# Patient Record
Sex: FEMALE | Race: AMERICAN INDIAN OR ALASKA NATIVE
[De-identification: names, ages, dates, MRNs, and addresses within clinical notes are randomized per-mention and may not be internally consistent; named-entity substitution may affect disease eponyms.]

---

## 2019-08-23 ENCOUNTER — HOSPITAL ENCOUNTER (INPATIENT)
Dept: HOSPITAL 5 - ED | Age: 49
LOS: 6 days | Discharge: HOME | DRG: 689 | End: 2019-08-29
Attending: HOSPITALIST | Admitting: INTERNAL MEDICINE
Payer: COMMERCIAL

## 2019-08-23 DIAGNOSIS — E11.9: ICD-10-CM

## 2019-08-23 DIAGNOSIS — I10: ICD-10-CM

## 2019-08-23 DIAGNOSIS — Z79.899: ICD-10-CM

## 2019-08-23 DIAGNOSIS — N30.01: Primary | ICD-10-CM

## 2019-08-23 DIAGNOSIS — G93.41: ICD-10-CM

## 2019-08-23 DIAGNOSIS — F32.9: ICD-10-CM

## 2019-08-23 DIAGNOSIS — R45.851: ICD-10-CM

## 2019-08-23 DIAGNOSIS — F10.10: ICD-10-CM

## 2019-08-23 LAB
ALBUMIN SERPL-MCNC: 3.8 G/DL (ref 3.9–5)
ALT SERPL-CCNC: 15 UNITS/L (ref 7–56)
BACTERIA #/AREA URNS HPF: (no result) /HPF
BASOPHILS # (AUTO): 0.1 K/MM3 (ref 0–0.1)
BASOPHILS NFR BLD AUTO: 1.1 % (ref 0–1.8)
BENZODIAZEPINES SCREEN,URINE: (no result)
BILIRUB UR QL STRIP: (no result)
BLOOD UR QL VISUAL: (no result)
BUN SERPL-MCNC: 17 MG/DL (ref 7–17)
BUN/CREAT SERPL: 24 %
CALCIUM SERPL-MCNC: 9.7 MG/DL (ref 8.4–10.2)
EOSINOPHIL # BLD AUTO: 0.1 K/MM3 (ref 0–0.4)
EOSINOPHIL NFR BLD AUTO: 1.8 % (ref 0–4.3)
HCT VFR BLD CALC: 35.7 % (ref 30.3–42.9)
HEMOLYSIS INDEX: 19
HGB BLD-MCNC: 12 GM/DL (ref 10.1–14.3)
LYMPHOCYTES # BLD AUTO: 3 K/MM3 (ref 1.2–5.4)
LYMPHOCYTES NFR BLD AUTO: 46.2 % (ref 13.4–35)
MCHC RBC AUTO-ENTMCNC: 34 % (ref 30–34)
MCV RBC AUTO: 99 FL (ref 79–97)
METHADONE SCREEN,URINE: (no result)
MONOCYTES # (AUTO): 0.8 K/MM3 (ref 0–0.8)
MONOCYTES % (AUTO): 12.2 % (ref 0–7.3)
MUCOUS THREADS #/AREA URNS HPF: (no result) /HPF
OPIATE SCREEN,URINE: (no result)
PH UR STRIP: 6 [PH] (ref 5–7)
PLATELET # BLD: 248 K/MM3 (ref 140–440)
PROT UR STRIP-MCNC: (no result) MG/DL
RBC # BLD AUTO: 3.62 M/MM3 (ref 3.65–5.03)
RBC #/AREA URNS HPF: 30 /HPF (ref 0–6)
UROBILINOGEN UR-MCNC: < 2 MG/DL (ref ?–2)
WBC #/AREA URNS HPF: 33 /HPF (ref 0–6)

## 2019-08-23 PROCEDURE — 80320 DRUG SCREEN QUANTALCOHOLS: CPT

## 2019-08-23 PROCEDURE — 82140 ASSAY OF AMMONIA: CPT

## 2019-08-23 PROCEDURE — 99406 BEHAV CHNG SMOKING 3-10 MIN: CPT

## 2019-08-23 PROCEDURE — 80307 DRUG TEST PRSMV CHEM ANLYZR: CPT

## 2019-08-23 PROCEDURE — 36415 COLL VENOUS BLD VENIPUNCTURE: CPT

## 2019-08-23 PROCEDURE — 82550 ASSAY OF CK (CPK): CPT

## 2019-08-23 PROCEDURE — 82962 GLUCOSE BLOOD TEST: CPT

## 2019-08-23 PROCEDURE — 80048 BASIC METABOLIC PNL TOTAL CA: CPT

## 2019-08-23 PROCEDURE — G0480 DRUG TEST DEF 1-7 CLASSES: HCPCS

## 2019-08-23 PROCEDURE — 85007 BL SMEAR W/DIFF WBC COUNT: CPT

## 2019-08-23 PROCEDURE — 93010 ELECTROCARDIOGRAM REPORT: CPT

## 2019-08-23 PROCEDURE — 93005 ELECTROCARDIOGRAM TRACING: CPT

## 2019-08-23 PROCEDURE — 80053 COMPREHEN METABOLIC PANEL: CPT

## 2019-08-23 PROCEDURE — 70450 CT HEAD/BRAIN W/O DYE: CPT

## 2019-08-23 PROCEDURE — 87086 URINE CULTURE/COLONY COUNT: CPT

## 2019-08-23 PROCEDURE — 81001 URINALYSIS AUTO W/SCOPE: CPT

## 2019-08-23 PROCEDURE — 85025 COMPLETE CBC W/AUTO DIFF WBC: CPT

## 2019-08-23 PROCEDURE — 87116 MYCOBACTERIA CULTURE: CPT

## 2019-08-23 NOTE — EMERGENCY DEPARTMENT REPORT
ED Altered Mental Status HPI





- General


Chief Complaint: Altered Mental Status


Stated Complaint: CONFUSION


Time Seen by Provider: 08/23/19 18:17


Source: EMS


Mode of arrival: Stretcher


Limitations: No Limitations





- History of Present Illness


Initial Comments: 





Vision is a 48-year-old female that presents from a local psychiatric facility 

for confusion.  Patient was over at [facility] on a 1013 for suicidal ideations 

and the patient was noted to be confused just prior to arrival.  Patient denies 

pain.  Patient denies any physical complaints.  Patient denies dysuria.  Patient

states she doesn't believe she is confused.


MD Complaint: altered mental status, confusion


-: Sudden


Consistency of Symptoms: constant


Associated Symptoms: denies other symptoms.  denies: chest pain, cough, 

diaphoresis, fever/chills, headaches, loss of appetite, malaise, nause

a/vomiting, rash, seizure, shortness of breath, syncope, weakness, foul smelling

urine, difficulty walking, diarrhea, incontinence





- Related Data


                                    Allergies











Allergy/AdvReac Type Severity Reaction Status Date / Time


 


No Known Allergies Allergy   Unverified 08/23/19 18:30














ED Review of Systems


ROS: 


Stated complaint: CONFUSION


Other details as noted in HPI





Constitutional: denies: chills, fever


Eyes: denies: eye pain, eye discharge, vision change


ENT: denies: ear pain, throat pain


Respiratory: denies: cough, shortness of breath, wheezing


Cardiovascular: denies: chest pain, palpitations


Endocrine: no symptoms reported


Gastrointestinal: denies: abdominal pain, nausea, diarrhea


Genitourinary: denies: urgency, dysuria, discharge


Musculoskeletal: denies: back pain, joint swelling, arthralgia


Skin: denies: rash, lesions


Neurological: confusion.  denies: headache, weakness, paresthesias


Psychiatric: denies: anxiety, depression


Hematological/Lymphatic: denies: easy bleeding, easy bruising





ED Past Medical Hx





- Past Medical History


Previous Medical History?: Yes


Hx Hypertension: Yes


Hx Diabetes: Yes





- Surgical History


Past Surgical History?: No





- Family History


Family history: no significant





- Social History


Smoking Status: Never Smoker


Substance Use Type: None





ED Physical Exam





- General


Limitations: No Limitations


General appearance: alert, in no apparent distress





- Head


Head exam: Present: atraumatic, normocephalic





- Eye


Eye exam: Present: normal appearance, PERRL


Pupils: Present: normal accommodation





- ENT


ENT exam: Present: mucous membranes moist





- Neck


Neck exam: Present: normal inspection





- Respiratory


Respiratory exam: Present: normal lung sounds bilaterally.  Absent: respiratory 

distress





- Cardiovascular


Cardiovascular Exam: Present: regular rate, normal rhythm.  Absent: systolic 

murmur, diastolic murmur, rubs, gallop





- GI/Abdominal


GI/Abdominal exam: Present: soft, normal bowel sounds





- Extremities Exam


Extremities exam: Present: normal inspection





- Back Exam


Back exam: Present: normal inspection





- Neurological Exam


Neurological exam: Present: alert, altered (patient is oriented 2.  Patient is 

disoriented to time.)





- Psychiatric


Psychiatric exam: Present: normal affect, normal mood





- Skin


Skin exam: Present: warm, dry, intact, normal color.  Absent: rash





- Assessment


Assessment Interval: Baseline





- Level of Consciousness


1a. Level of Consciousness: alert/keenly responsive





- LOC Questions


1b. LOC Questions: answers both correctly





- LOC Command


1c. LOC Commands: performs tasks correctly





- Best Gaze


2. Best Gaze: normal





- Visual


3. Visual: no visual loss





- Facial Palsy


4. Facial Palsy: normal symmetrical movement





- Motor Arm


5a. Motor Arm Left: no drift


5b. Motor Arm Right: no drift





- Motor Leg


6a. Motor Leg Left: no drift


6b. Motor Leg Right: no drift





- Limb Ataxia


7. Limb Ataxia: absent





- Sensory


8. Sensory: normal





- Best Language


9. Best Language: no aphasia





- Dysarthria


10. Dysarthria: normal





- Extinction and Inattention


11. Extinction/Inattention: no abnormality





- Scoring


Total Score: 0


Stroke Severity: No Stroke Symptoms





ED Course


                                   Vital Signs











  08/23/19 08/23/19 08/23/19





  18:05 18:06 18:13


 


Temperature  98.3 F 


 


Pulse Rate 93 H 94 H 


 


Respiratory 18 18 18





Rate   


 


Blood Pressure  116/72 


 


Blood Pressure 116/72  





[Left]   


 


O2 Sat by Pulse 99 99 99





Oximetry   














  08/23/19 08/23/19 08/23/19





  19:31 19:45 20:01


 


Temperature   


 


Pulse Rate 76 95 H 80


 


Respiratory 13 16 12





Rate   


 


Blood Pressure 125/81 121/82 124/80


 


Blood Pressure   





[Left]   


 


O2 Sat by Pulse 99 99 99





Oximetry   














  08/23/19 08/23/19 08/23/19





  20:15 20:31 20:45


 


Temperature   


 


Pulse Rate 82 85 85


 


Respiratory 12 11 L 14





Rate   


 


Blood Pressure 119/80 124/84 120/77


 


Blood Pressure   





[Left]   


 


O2 Sat by Pulse 100  97





Oximetry   














  08/23/19





  21:00


 


Temperature 


 


Pulse Rate 85


 


Respiratory 16





Rate 


 


Blood Pressure 107/65


 


Blood Pressure 





[Left] 


 


O2 Sat by Pulse 95





Oximetry 














- Reevaluation(s)


Reevaluation #1: 


Patient is still confused.  I discussed results with patient.  Patient will be 

admitted to the hospitalist service.


08/23/19 22:05








- Consultations


Consultation #1: 


Hospitalist consultation for admission.  Hospitalist to admit patient.


08/23/19 22:05








- Lab Data


Result diagrams: 


                                 08/23/19 18:26





                                 08/23/19 18:26


                                   Lab Results











  08/23/19 08/23/19 08/23/19 Range/Units





  18:26 18:26 18:26 


 


WBC  6.6    (4.5-11.0)  K/mm3


 


RBC  3.62 L    (3.65-5.03)  M/mm3


 


Hgb  12.0    (10.1-14.3)  gm/dl


 


Hct  35.7    (30.3-42.9)  %


 


MCV  99 H    (79-97)  fl


 


MCH  33 H    (28-32)  pg


 


MCHC  34    (30-34)  %


 


RDW  13.6    (13.2-15.2)  %


 


Plt Count  248    (140-440)  K/mm3


 


Lymph % (Auto)  46.2 H    (13.4-35.0)  %


 


Mono % (Auto)  12.2 H    (0.0-7.3)  %


 


Eos % (Auto)  1.8    (0.0-4.3)  %


 


Baso % (Auto)  1.1    (0.0-1.8)  %


 


Lymph #  3.0    (1.2-5.4)  K/mm3


 


Mono #  0.8    (0.0-0.8)  K/mm3


 


Eos #  0.1    (0.0-0.4)  K/mm3


 


Baso #  0.1    (0.0-0.1)  K/mm3


 


Seg Neutrophils %  38.7 L    (40.0-70.0)  %


 


Seg Neutrophils #  2.5    (1.8-7.7)  K/mm3


 


Sodium   139   (137-145)  mmol/L


 


Potassium   5.0   (3.6-5.0)  mmol/L


 


Chloride   103.2   ()  mmol/L


 


Carbon Dioxide   24   (22-30)  mmol/L


 


Anion Gap   17   mmol/L


 


BUN   17   (7-17)  mg/dL


 


Creatinine   0.7   (0.7-1.2)  mg/dL


 


Estimated GFR   > 60   ml/min


 


BUN/Creatinine Ratio   24   %


 


Glucose   107 H   ()  mg/dL


 


Calcium   9.7   (8.4-10.2)  mg/dL


 


Total Bilirubin   0.30   (0.1-1.2)  mg/dL


 


AST   20   (5-40)  units/L


 


ALT   15   (7-56)  units/L


 


Alkaline Phosphatase   72   ()  units/L


 


Ammonia    37.0  (25-60)  umol/L


 


Total Creatine Kinase   44   ()  units/L


 


Total Protein   6.8   (6.3-8.2)  g/dL


 


Albumin   3.8 L   (3.9-5)  g/dL


 


Albumin/Globulin Ratio   1.3   %


 


Urine Color     (Yellow)  


 


Urine Turbidity     (Clear)  


 


Urine pH     (5.0-7.0)  


 


Ur Specific Gravity     (1.003-1.030)  


 


Urine Protein     (Negative)  mg/dL


 


Urine Glucose (UA)     (Negative)  mg/dL


 


Urine Ketones     (Negative)  mg/dL


 


Urine Blood     (Negative)  


 


Urine Nitrite     (Negative)  


 


Urine Bilirubin     (Negative)  


 


Urine Urobilinogen     (<2.0)  mg/dL


 


Ur Leukocyte Esterase     (Negative)  


 


Urine WBC (Auto)     (0.0-6.0)  /HPF


 


Urine RBC (Auto)     (0.0-6.0)  /HPF


 


U Epithel Cells (Auto)     (0-13.0)  /HPF


 


Urine Bacteria (Auto)     (Negative)  /HPF


 


Urine Mucus     /HPF


 


Salicylates     (2.8-20.0)  mg/dL


 


Urine Opiates Screen     


 


Urine Methadone Screen     


 


Acetaminophen     (10.0-30.0)  ug/mL


 


Ur Barbiturates Screen     


 


Ur Phencyclidine Scrn     


 


Ur Amphetamines Screen     


 


U Benzodiazepines Scrn     


 


Urine Cocaine Screen     


 


U Marijuana (THC) Screen     


 


Drugs of Abuse Note     


 


Plasma/Serum Alcohol     (0-0.07)  %














  08/23/19 08/23/19 08/23/19 Range/Units





  18:26 18:26 18:26 


 


WBC     (4.5-11.0)  K/mm3


 


RBC     (3.65-5.03)  M/mm3


 


Hgb     (10.1-14.3)  gm/dl


 


Hct     (30.3-42.9)  %


 


MCV     (79-97)  fl


 


MCH     (28-32)  pg


 


MCHC     (30-34)  %


 


RDW     (13.2-15.2)  %


 


Plt Count     (140-440)  K/mm3


 


Lymph % (Auto)     (13.4-35.0)  %


 


Mono % (Auto)     (0.0-7.3)  %


 


Eos % (Auto)     (0.0-4.3)  %


 


Baso % (Auto)     (0.0-1.8)  %


 


Lymph #     (1.2-5.4)  K/mm3


 


Mono #     (0.0-0.8)  K/mm3


 


Eos #     (0.0-0.4)  K/mm3


 


Baso #     (0.0-0.1)  K/mm3


 


Seg Neutrophils %     (40.0-70.0)  %


 


Seg Neutrophils #     (1.8-7.7)  K/mm3


 


Sodium     (137-145)  mmol/L


 


Potassium     (3.6-5.0)  mmol/L


 


Chloride     ()  mmol/L


 


Carbon Dioxide     (22-30)  mmol/L


 


Anion Gap     mmol/L


 


BUN     (7-17)  mg/dL


 


Creatinine     (0.7-1.2)  mg/dL


 


Estimated GFR     ml/min


 


BUN/Creatinine Ratio     %


 


Glucose     ()  mg/dL


 


Calcium     (8.4-10.2)  mg/dL


 


Total Bilirubin     (0.1-1.2)  mg/dL


 


AST     (5-40)  units/L


 


ALT     (7-56)  units/L


 


Alkaline Phosphatase     ()  units/L


 


Ammonia     (25-60)  umol/L


 


Total Creatine Kinase     ()  units/L


 


Total Protein     (6.3-8.2)  g/dL


 


Albumin     (3.9-5)  g/dL


 


Albumin/Globulin Ratio     %


 


Urine Color     (Yellow)  


 


Urine Turbidity     (Clear)  


 


Urine pH     (5.0-7.0)  


 


Ur Specific Gravity     (1.003-1.030)  


 


Urine Protein     (Negative)  mg/dL


 


Urine Glucose (UA)     (Negative)  mg/dL


 


Urine Ketones     (Negative)  mg/dL


 


Urine Blood     (Negative)  


 


Urine Nitrite     (Negative)  


 


Urine Bilirubin     (Negative)  


 


Urine Urobilinogen     (<2.0)  mg/dL


 


Ur Leukocyte Esterase     (Negative)  


 


Urine WBC (Auto)     (0.0-6.0)  /HPF


 


Urine RBC (Auto)     (0.0-6.0)  /HPF


 


U Epithel Cells (Auto)     (0-13.0)  /HPF


 


Urine Bacteria (Auto)     (Negative)  /HPF


 


Urine Mucus     /HPF


 


Salicylates  < 0.3 L    (2.8-20.0)  mg/dL


 


Urine Opiates Screen     


 


Urine Methadone Screen     


 


Acetaminophen   < 5.0 L   (10.0-30.0)  ug/mL


 


Ur Barbiturates Screen     


 


Ur Phencyclidine Scrn     


 


Ur Amphetamines Screen     


 


U Benzodiazepines Scrn     


 


Urine Cocaine Screen     


 


U Marijuana (THC) Screen     


 


Drugs of Abuse Note     


 


Plasma/Serum Alcohol    < 0.01  (0-0.07)  %














  08/23/19 08/23/19 Range/Units





  18:46 18:46 


 


WBC    (4.5-11.0)  K/mm3


 


RBC    (3.65-5.03)  M/mm3


 


Hgb    (10.1-14.3)  gm/dl


 


Hct    (30.3-42.9)  %


 


MCV    (79-97)  fl


 


MCH    (28-32)  pg


 


MCHC    (30-34)  %


 


RDW    (13.2-15.2)  %


 


Plt Count    (140-440)  K/mm3


 


Lymph % (Auto)    (13.4-35.0)  %


 


Mono % (Auto)    (0.0-7.3)  %


 


Eos % (Auto)    (0.0-4.3)  %


 


Baso % (Auto)    (0.0-1.8)  %


 


Lymph #    (1.2-5.4)  K/mm3


 


Mono #    (0.0-0.8)  K/mm3


 


Eos #    (0.0-0.4)  K/mm3


 


Baso #    (0.0-0.1)  K/mm3


 


Seg Neutrophils %    (40.0-70.0)  %


 


Seg Neutrophils #    (1.8-7.7)  K/mm3


 


Sodium    (137-145)  mmol/L


 


Potassium    (3.6-5.0)  mmol/L


 


Chloride    ()  mmol/L


 


Carbon Dioxide    (22-30)  mmol/L


 


Anion Gap    mmol/L


 


BUN    (7-17)  mg/dL


 


Creatinine    (0.7-1.2)  mg/dL


 


Estimated GFR    ml/min


 


BUN/Creatinine Ratio    %


 


Glucose    ()  mg/dL


 


Calcium    (8.4-10.2)  mg/dL


 


Total Bilirubin    (0.1-1.2)  mg/dL


 


AST    (5-40)  units/L


 


ALT    (7-56)  units/L


 


Alkaline Phosphatase    ()  units/L


 


Ammonia    (25-60)  umol/L


 


Total Creatine Kinase    ()  units/L


 


Total Protein    (6.3-8.2)  g/dL


 


Albumin    (3.9-5)  g/dL


 


Albumin/Globulin Ratio    %


 


Urine Color  Yellow   (Yellow)  


 


Urine Turbidity  Slightly-cloudy   (Clear)  


 


Urine pH  6.0   (5.0-7.0)  


 


Ur Specific Gravity  1.020   (1.003-1.030)  


 


Urine Protein  <15 mg/dl   (Negative)  mg/dL


 


Urine Glucose (UA)  Neg   (Negative)  mg/dL


 


Urine Ketones  Neg   (Negative)  mg/dL


 


Urine Blood  Neg   (Negative)  


 


Urine Nitrite  Neg   (Negative)  


 


Urine Bilirubin  Neg   (Negative)  


 


Urine Urobilinogen  < 2.0   (<2.0)  mg/dL


 


Ur Leukocyte Esterase  Lg   (Negative)  


 


Urine WBC (Auto)  33.0 H   (0.0-6.0)  /HPF


 


Urine RBC (Auto)  30.0   (0.0-6.0)  /HPF


 


U Epithel Cells (Auto)  12.0   (0-13.0)  /HPF


 


Urine Bacteria (Auto)  1+   (Negative)  /HPF


 


Urine Mucus  Few   /HPF


 


Salicylates    (2.8-20.0)  mg/dL


 


Urine Opiates Screen   Presumptive negative  


 


Urine Methadone Screen   Presumptive negative  


 


Acetaminophen    (10.0-30.0)  ug/mL


 


Ur Barbiturates Screen   Presumptive negative  


 


Ur Phencyclidine Scrn   Presumptive negative  


 


Ur Amphetamines Screen   Presumptive negative  


 


U Benzodiazepines Scrn   Presumptive negative  


 


Urine Cocaine Screen   Presumptive negative  


 


U Marijuana (THC) Screen   Presumptive negative  


 


Drugs of Abuse Note   Disclamer  


 


Plasma/Serum Alcohol    (0-0.07)  %














- EKG Data


-: EKG Interpreted by Me


EKG shows normal: sinus rhythm, axis, intervals, QRS complexes, ST-T waves


Rate: normal





- Radiology Data


Radiology results: report reviewed





NONENHANCED CT SCAN OF THE BRAIN: 





INDICATION: 


Altered Mental Status. 





TECHNIQUE: Routine CT head without contrast. Sagittal and coronal reformatted 

images were obtained.


All CT scans at this location are performed using CT dose reduction for ALARA by

 means of automated 


exposure control. 





COMPARISON: 


None. 





FINDINGS: 





BRAIN / INTRACRANIAL CONTENTS: No acute hemorrhage, mass effect, midline shift, 

hydrocephalus, or 


acute, large territorial infarct. Extracerebral space is seen bifrontally 

symmetrically with 


prominent interhemispheric fissure. This is due to involution. Extra cerebellar 

space is also seen 


bilaterally symmetrically. No significant white matter abnormality. 





CRANIOCERVICAL JUNCTION: Mild tonsillar ectopia is seen. Cervicomedullary 

junction is not 


compromised. 





ORBITS: Bony remodeling is seen along the medial wall of the left orbit. Though 

the this appears to


be from old healed trauma, this could also be congenital. 


SINUSES / MASTOIDS: No significant abnormality of the visualized paranasal 

sinuses or mastoid air 


cells. 





ADDITIONAL FINDINGS: None. 





IMPRESSION: 


I do not see an acute parenchymal lesion in the brain. 








Critical Care Time: Yes


Critical care attestation.: 


If time is entered above; I have spent that time in minutes in the direct care 

of this critically ill patient, excluding procedure time.





Critical Care Time: 





35 minutes





ED Disposition


Clinical Impression: 


 Encephalopathy acute, Confusion





Altered mental state


Qualifiers:


 Altered mental status type: unspecified Qualified Code(s): R41.82 - Altered men

vinod status, unspecified





UTI (urinary tract infection)


Qualifiers:


 Urinary tract infection type: acute cystitis Hematuria presence: with hematuria

 Qualified Code(s): N30.01 - Acute cystitis with hematuria





Disposition: DC-09 OP ADMIT IP TO THIS HOSP


Is pt being admited?: Yes


Does the pt Need Aspirin: No


Condition: Critical


Time of Disposition: 22:07

## 2019-08-23 NOTE — CAT SCAN REPORT
NONENHANCED CT SCAN OF THE BRAIN:



INDICATION:

Altered Mental Status.



TECHNIQUE: Routine CT head without contrast. Sagittal and coronal reformatted images were obtained. A
ll CT scans at this location are performed using CT dose reduction for ALARA by means of automated ex
posure control.



COMPARISON: 

None.



FINDINGS:



BRAIN / INTRACRANIAL CONTENTS: No acute hemorrhage, mass effect, midline shift, hydrocephalus, or acu
te, large territorial infarct. Extracerebral space is seen bifrontally symmetrically with prominent i
nterhemispheric fissure. This is due to involution. Extra cerebellar space is also seen bilaterally s
ymmetrically. No significant white matter abnormality. 



CRANIOCERVICAL JUNCTION: Mild tonsillar ectopia is seen. Cervicomedullary junction is not compromised
.



ORBITS: Bony remodeling is seen along the medial wall of the left orbit. Though the this appears to b
e from old healed trauma, this could also be congenital.

SINUSES / MASTOIDS: No significant abnormality of the visualized paranasal sinuses or mastoid air darrius
ls.



ADDITIONAL FINDINGS: None. 



IMPRESSION:

I do not see an acute parenchymal lesion in the brain.



Signer Name: Olga Lidia Pickard MD 

Signed: 8/23/2019 9:53 PM

 Workstation Name: [id number]

## 2019-08-24 LAB
ANISOCYTOSIS BLD QL SMEAR: (no result)
BAND NEUTROPHILS # (MANUAL): 0 K/MM3
BUN SERPL-MCNC: 11 MG/DL (ref 7–17)
BUN/CREAT SERPL: 22 %
CALCIUM SERPL-MCNC: 9 MG/DL (ref 8.4–10.2)
HCT VFR BLD CALC: 34.4 % (ref 30.3–42.9)
HEMOLYSIS INDEX: 3
HGB BLD-MCNC: 11.5 GM/DL (ref 10.1–14.3)
MCHC RBC AUTO-ENTMCNC: 33 % (ref 30–34)
MCV RBC AUTO: 98 FL (ref 79–97)
MYELOCYTES # (MANUAL): 0 K/MM3
PLATELET # BLD: 232 K/MM3 (ref 140–440)
PROMYELOCYTES # (MANUAL): 0 K/MM3
RBC # BLD AUTO: 3.5 M/MM3 (ref 3.65–5.03)
TOTAL CELLS COUNTED BLD: 100

## 2019-08-24 RX ADMIN — FAMOTIDINE SCH MG: 10 INJECTION, SOLUTION INTRAVENOUS at 22:28

## 2019-08-24 RX ADMIN — Medication SCH ML: at 22:28

## 2019-08-24 RX ADMIN — LORAZEPAM PRN MG: 2 INJECTION INTRAMUSCULAR; INTRAVENOUS at 16:19

## 2019-08-24 RX ADMIN — LORAZEPAM PRN MG: 2 INJECTION INTRAMUSCULAR; INTRAVENOUS at 21:46

## 2019-08-24 RX ADMIN — CEFTRIAXONE SODIUM SCH MLS/HR: 1 INJECTION, POWDER, FOR SOLUTION INTRAMUSCULAR; INTRAVENOUS at 10:37

## 2019-08-24 RX ADMIN — Medication SCH ML: at 10:38

## 2019-08-24 RX ADMIN — ENOXAPARIN SODIUM SCH MG: 100 INJECTION SUBCUTANEOUS at 10:37

## 2019-08-24 RX ADMIN — SODIUM CHLORIDE SCH MLS/HR: 0.9 INJECTION, SOLUTION INTRAVENOUS at 02:00

## 2019-08-24 RX ADMIN — NICOTINE SCH MG: 14 PATCH TRANSDERMAL at 18:17

## 2019-08-24 RX ADMIN — FAMOTIDINE SCH MG: 10 INJECTION, SOLUTION INTRAVENOUS at 10:37

## 2019-08-24 RX ADMIN — NICOTINE SCH: 14 PATCH TRANSDERMAL at 16:21

## 2019-08-24 RX ADMIN — FAMOTIDINE SCH MG: 10 INJECTION, SOLUTION INTRAVENOUS at 00:09

## 2019-08-24 NOTE — HISTORY AND PHYSICAL REPORT
<SAINT-SYLVESTER,HURLINE - Last Filed: 08/24/19 00:29>





History of Present Illness


Date of examination: 08/24/19


Date of admission: 


08/23/19 23:01





Chief complaint: 





AMS, confusion


History of present illness: 





Pt is a 48-year-old female with PMHx of HTN, Depression who was brought to the 

ER from a mental facility for c/o confusion. Patient was admitted at [facility] 

(psych facility) for depression and suicidal ideations, she was on 1013. Pt was 

sent to the [facility] ER for confusion and changed in mental status. In the ER, pt 

had a UA that was positive for UTI. Pt was A&O x 3, she was noted to have some 

confusion with the time of the day, she otherwise able to answer questions 

appropriately, she denies any back pain, denies dysuria, denies pain in 

micturation, denies fever, denies chills. A CT scan of the brain was negative 

for any acute lesions, she was started on antibiotic an admitted for further 

evaluation and treatment.   





Past History


Past Medical History: diabetes, hypertension, other (depression)


Past Surgical History: No surgical history


Social history: no significant social history


Family history: no significant family history





Medications and Allergies


                                    Allergies











Allergy/AdvReac Type Severity Reaction Status Date / Time


 


No Known Allergies Allergy   Unverified 08/23/19 18:30











                                Home Medications











 Medication  Instructions  Recorded  Confirmed  Last Taken  Type


 


Amlodipine Besylate [Norvasc] 5 mg PO DAILY 08/23/19 08/23/19 Unknown History


 


Folic Acid [Folvite] 1 tab PO DAILY 08/23/19 08/23/19 Unknown History











Active Meds: 


Active Medications





Acetaminophen (Tylenol)  650 mg PO Q4H PRN


   PRN Reason: Pain MILD(1-3)/Fever >100.5/HA


Enoxaparin Sodium (Lovenox)  40 mg SUB-Q QDAY [facility]


Famotidine (Pepcid)  20 mg IV BID [facility]


   Last Admin: 08/24/19 00:09 Dose:  20 mg


   Documented by: 


Sodium Chloride (Nacl 0.9% 1000 Ml)  1,000 mls @ 100 mls/hr IV AS DIRECT [facility]


Magnesium Hydroxide (Milk Of Magnesia)  30 ml PO Q4H PRN


   PRN Reason: Constipation


Ondansetron HCl (Zofran)  4 mg IV Q8H PRN


   PRN Reason: Nausea And Vomiting


Sodium Chloride (Sodium Chloride Flush Syringe 10 Ml)  10 ml IV BID [facility]


Sodium Chloride (Sodium Chloride Flush Syringe 10 Ml)  10 ml IV PRN PRN


   PRN Reason: LINE FLUSH











Review of Systems


Neurological: confusion





Exam





- Constitutional


Vitals: 


                                        











Temp Pulse Resp BP Pulse Ox


 


 98.1 F   91 H  11 L  126/85   100 


 


 08/23/19 23:05  08/23/19 23:31  08/23/19 23:31  08/23/19 23:31  08/23/19 23:31











General appearance: Present: no acute distress





- EENT


Eyes: Present: EOM intact


ENT: hearing intact





- Neck


Neck: Present: supple





- Respiratory


Respiratory effort: normal


Respiratory: bilateral: CTA





- Cardiovascular


Rhythm: regular


Heart Sounds: Present: S1 & S2





- Extremities


Extremities: no ischemia, No edema


Peripheral Pulses: within normal limits





- Abdominal


General gastrointestinal: Present: non-tender, non-distended


Female genitourinary: Present: deferred





- Rectal


Rectal Exam: deferred





- Integumentary


Integumentary: Present: warm, dry





- Musculoskeletal


Musculoskeletal: strength equal bilaterally





- Psychiatric


Psychiatric: cooperative





- Neurologic


Neurologic: moves all extremities





Results





- Labs


CBC & Chem 7: 


                                 08/23/19 18:26





                                 08/23/19 18:26


Labs: 


                             Laboratory Last Values











WBC  6.6 K/mm3 (4.5-11.0)   08/23/19  18:26    


 


RBC  3.62 M/mm3 (3.65-5.03)  L  08/23/19  18:26    


 


Hgb  12.0 gm/dl (10.1-14.3)   08/23/19  18:26    


 


Hct  35.7 % (30.3-42.9)   08/23/19  18:26    


 


MCV  99 fl (79-97)  H  08/23/19  18:26    


 


MCH  33 pg (28-32)  H  08/23/19  18:26    


 


MCHC  34 % (30-34)   08/23/19  18:26    


 


RDW  13.6 % (13.2-15.2)   08/23/19  18:26    


 


Plt Count  248 K/mm3 (140-440)   08/23/19  18:26    


 


Lymph % (Auto)  46.2 % (13.4-35.0)  H  08/23/19  18:26    


 


Mono % (Auto)  12.2 % (0.0-7.3)  H  08/23/19  18:26    


 


Eos % (Auto)  1.8 % (0.0-4.3)   08/23/19  18:26    


 


Baso % (Auto)  1.1 % (0.0-1.8)   08/23/19  18:26    


 


Lymph #  3.0 K/mm3 (1.2-5.4)   08/23/19  18:26    


 


Mono #  0.8 K/mm3 (0.0-0.8)   08/23/19  18:26    


 


Eos #  0.1 K/mm3 (0.0-0.4)   08/23/19  18:26    


 


Baso #  0.1 K/mm3 (0.0-0.1)   08/23/19  18:26    


 


Seg Neutrophils %  38.7 % (40.0-70.0)  L  08/23/19  18:26    


 


Seg Neutrophils #  2.5 K/mm3 (1.8-7.7)   08/23/19  18:26    


 


Sodium  139 mmol/L (137-145)   08/23/19  18:26    


 


Potassium  5.0 mmol/L (3.6-5.0)   08/23/19  18:26    


 


Chloride  103.2 mmol/L ()   08/23/19  18:26    


 


Carbon Dioxide  24 mmol/L (22-30)   08/23/19  18:26    


 


  17 mmol/L  08/23/19  18:26    


 


BUN  17 mg/dL (7-17)   08/23/19  18:26    


 


  0.7 mg/dL (0.7-1.2)   08/23/19  18:26    


 


Estimated GFR  > 60 ml/min  08/23/19  18:26    


 


  24 %  08/23/19  18:26    


 


Glucose  107 mg/dL ()  H  08/23/19  18:26    


 


Calcium  9.7 mg/dL (8.4-10.2)   08/23/19  18:26    


 


  0.30 mg/dL (0.1-1.2)   08/23/19  18:26    


 


AST  20 units/L (5-40)   08/23/19  18:26    


 


ALT  15 units/L (7-56)   08/23/19  18:26    


 


  72 units/L ()   08/23/19  18:26    


 


  37.0 umol/L (25-60)   08/23/19  18:26    


 


  44 units/L ()   08/23/19  18:26    


 


  6.8 g/dL (6.3-8.2)   08/23/19  18:26    


 


  3.8 g/dL (3.9-5)  L  08/23/19  18:26    


 


  1.3 %  08/23/19  18:26    


 


  Yellow  (Yellow)   08/23/19  18:46    


 


  Slightly-cloudy  (Clear)   08/23/19  18:46    


 


  6.0  (5.0-7.0)   08/23/19  18:46    


 


Ur Specific Gravity  1.020  (1.003-1.030)   08/23/19  18:46    


 


  <15 mg/dl mg/dL (Negative)   08/23/19  18:46    


 


  Neg mg/dL (Negative)   08/23/19  18:46    


 


  Neg mg/dL (Negative)   08/23/19  18:46    


 


  Neg  (Negative)   08/23/19  18:46    


 


  Neg  (Negative)   08/23/19  18:46    


 


  Neg  (Negative)   08/23/19  18:46    


 


  < 2.0 mg/dL (<2.0)   08/23/19  18:46    


 


Ur Leukocyte Esterase  Lg  (Negative)   08/23/19  18:46    


 


  33.0 /HPF (0.0-6.0)  H  08/23/19  18:46    


 


  30.0 /HPF (0.0-6.0)   08/23/19  18:46    


 


U Epithel Cells (Auto)  12.0 /HPF (0-13.0)   08/23/19  18:46    


 


  1+ /HPF (Negative)   08/23/19  18:46    


 


  Few /HPF  08/23/19  18:46    


 


Salicylates  < 0.3 mg/dL (2.8-20.0)  L  08/23/19  18:26    


 


  Presumptive negative   08/23/19  18:46    


 


  Presumptive negative   08/23/19  18:46    


 


Acetaminophen  < 5.0 ug/mL (10.0-30.0)  L  08/23/19  18:26    


 


Ur Barbiturates Screen  Presumptive negative   08/23/19  18:46    


 


Ur Phencyclidine Scrn  Presumptive negative   08/23/19  18:46    


 


Ur Amphetamines Screen  Presumptive negative   08/23/19  18:46    


 


U Benzodiazepines Scrn  Presumptive negative   08/23/19  18:46    


 


  Presumptive negative   08/23/19  18:46    


 


U Marijuana (THC) Screen  Presumptive negative   08/23/19  18:46    


 


  Disclamer   08/23/19  18:46    


 


Plasma/Serum Alcohol  < 0.01 % (0-0.07)   08/23/19  18:26    














Assessment and Plan


Assessment and plan: 





1. AMS/Confusion (possibly due to delirium)


2. Acute UTI


3. H/o Depression with suicidal Isolation


4. DM thpe 2 (Controlled)


5. HTM (BP stable)





Plan:


Admit to med/surg


IVF for hydration


Continue IV antibiotic 


Resume home meds


1013 for completed


1-1 observation for h/o suicidal ideation


Regular insulin per sliding scale


DVT prophylaxis with Lovenox








Advance Directives: Yes


VTE prophylaxis?: Chemical


Plan of care discussed with patient/family: Yes





<NEHEMIAS HU - Last Filed: 08/24/19 04:13>





History of Present Illness


Date of admission: 


08/23/19 23:01








Medications and Allergies


Active Meds: 


Active Medications





Acetaminophen (Tylenol)  650 mg PO Q4H PRN


   PRN Reason: Pain MILD(1-3)/Fever >100.5/HA


Dextrose (D50w (25gm) Syringe)  50 ml IV PRN PRN


   PRN Reason: Hypoglycemia


Enoxaparin Sodium (Lovenox)  40 mg SUB-Q QDAY [facility]


Famotidine (Pepcid)  20 mg IV BID [facility]


   Last Admin: 08/24/19 00:09 Dose:  20 mg


   Documented by: 


Sodium Chloride (Nacl 0.9% 1000 Ml)  1,000 mls @ 100 mls/hr IV AS DIRECT [facility]


   Last Admin: 08/24/19 02:00 Dose:  100 mls/hr


   Documented by: 


Ceftriaxone Sodium (Rocephin/Ns 1 Gm/50 Ml)  1 gm in 50 mls @ 100 mls/hr IV 

Q24HR [facility]; Protocol


Insulin Glargine (Lantus)  10 units SUB-Q QHS [facility]


Insulin Human Lispro (Humalog)  0 unit SUB-Q ACHS [facility]; Protocol


Magnesium Hydroxide (Milk Of Magnesia)  30 ml PO Q4H PRN


   PRN Reason: Constipation


Ondansetron HCl (Zofran)  4 mg IV Q8H PRN


   PRN Reason: Nausea And Vomiting


Sodium Chloride (Sodium Chloride Flush Syringe 10 Ml)  10 ml IV BID [facility]


Sodium Chloride (Sodium Chloride Flush Syringe 10 Ml)  10 ml IV PRN PRN


   PRN Reason: LINE FLUSH


   Last Admin: 08/24/19 02:01 Dose:  10 ml


   Documented by: 











Exam





- Constitutional


Vitals: 


                                        











Temp Pulse Resp BP Pulse Ox


 


 97.7 F   75   18   142/92   99 


 


 08/24/19 01:45  08/24/19 01:45  08/24/19 01:45  08/24/19 01:45  08/24/19 01:45














Results





- Labs


CBC & Chem 7: 


                                 08/23/19 18:26





                                 08/23/19 18:26


Labs: 


                             Laboratory Last Values











WBC  6.6 K/mm3 (4.5-11.0)   08/23/19  18:26    


 


RBC  3.62 M/mm3 (3.65-5.03)  L  08/23/19  18:26    


 


Hgb  12.0 gm/dl (10.1-14.3)   08/23/19  18:26    


 


Hct  35.7 % (30.3-42.9)   08/23/19  18:26    


 


MCV  99 fl (79-97)  H  08/23/19  18:26    


 


MCH  33 pg (28-32)  H  08/23/19  18:26    


 


MCHC  34 % (30-34)   08/23/19  18:26    


 


RDW  13.6 % (13.2-15.2)   08/23/19  18:26    


 


Plt Count  248 K/mm3 (140-440)   08/23/19  18:26    


 


Lymph % (Auto)  46.2 % (13.4-35.0)  H  08/23/19  18:26    


 


Mono % (Auto)  12.2 % (0.0-7.3)  H  08/23/19  18:26    


 


Eos % (Auto)  1.8 % (0.0-4.3)   08/23/19  18:26    


 


Baso % (Auto)  1.1 % (0.0-1.8)   08/23/19  18:26    


 


Lymph #  3.0 K/mm3 (1.2-5.4)   08/23/19  18:26    


 


Mono #  0.8 K/mm3 (0.0-0.8)   08/23/19  18:26    


 


Eos #  0.1 K/mm3 (0.0-0.4)   08/23/19  18:26    


 


Baso #  0.1 K/mm3 (0.0-0.1)   08/23/19  18:26    


 


Seg Neutrophils %  38.7 % (40.0-70.0)  L  08/23/19  18:26    


 


Seg Neutrophils #  2.5 K/mm3 (1.8-7.7)   08/23/19  18:26    


 


Sodium  139 mmol/L (137-145)   08/23/19  18:26    


 


Potassium  5.0 mmol/L (3.6-5.0)   08/23/19  18:26    


 


Chloride  103.2 mmol/L ()   08/23/19  18:26    


 


Carbon Dioxide  24 mmol/L (22-30)   08/23/19  18:26    


 


  17 mmol/L  08/23/19  18:26    


 


BUN  17 mg/dL (7-17)   08/23/19  18:26    


 


  0.7 mg/dL (0.7-1.2)   08/23/19  18:26    


 


Estimated GFR  > 60 ml/min  08/23/19  18:26    


 


  24 %  08/23/19  18:26    


 


Glucose  107 mg/dL ()  H  08/23/19  18:26    


 


Calcium  9.7 mg/dL (8.4-10.2)   08/23/19  18:26    


 


  0.30 mg/dL (0.1-1.2)   08/23/19  18:26    


 


AST  20 units/L (5-40)   08/23/19  18:26    


 


ALT  15 units/L (7-56)   08/23/19  18:26    


 


  72 units/L ()   08/23/19  18:26    


 


  37.0 umol/L (25-60)   08/23/19  18:26    


 


  44 units/L ()   08/23/19  18:26    


 


  6.8 g/dL (6.3-8.2)   08/23/19  18:26    


 


  3.8 g/dL (3.9-5)  L  08/23/19  18:26    


 


  1.3 %  08/23/19  18:26    


 


  Yellow  (Yellow)   08/23/19  18:46    


 


  Slightly-cloudy  (Clear)   08/23/19  18:46    


 


  6.0  (5.0-7.0)   08/23/19  18:46    


 


Ur Specific Gravity  1.020  (1.003-1.030)   08/23/19  18:46    


 


  <15 mg/dl mg/dL (Negative)   08/23/19  18:46    


 


  Neg mg/dL (Negative)   08/23/19  18:46    


 


  Neg mg/dL (Negative)   08/23/19  18:46    


 


  Neg  (Negative)   08/23/19  18:46    


 


  Neg  (Negative)   08/23/19  18:46    


 


  Neg  (Negative)   08/23/19  18:46    


 


  < 2.0 mg/dL (<2.0)   08/23/19  18:46    


 


Ur Leukocyte Esterase  Lg  (Negative)   08/23/19  18:46    


 


  33.0 /HPF (0.0-6.0)  H  08/23/19  18:46    


 


  30.0 /HPF (0.0-6.0)   08/23/19  18:46    


 


U Epithel Cells (Auto)  12.0 /HPF (0-13.0)   08/23/19  18:46    


 


  1+ /HPF (Negative)   08/23/19  18:46    


 


  Few /HPF  08/23/19  18:46    


 


Salicylates  < 0.3 mg/dL (2.8-20.0)  L  08/23/19  18:26    


 


  Presumptive negative   08/23/19  18:46    


 


  Presumptive negative   08/23/19  18:46    


 


Acetaminophen  < 5.0 ug/mL (10.0-30.0)  L  08/23/19  18:26    


 


Ur Barbiturates Screen  Presumptive negative   08/23/19  18:46    


 


Ur Phencyclidine Scrn  Presumptive negative   08/23/19  18:46    


 


Ur Amphetamines Screen  Presumptive negative   08/23/19  18:46    


 


U Benzodiazepines Scrn  Presumptive negative   08/23/19  18:46    


 


  Presumptive negative   08/23/19  18:46    


 


U Marijuana (THC) Screen  Presumptive negative   08/23/19  18:46    


 


  Disclamer   08/23/19  18:46    


 


Plasma/Serum Alcohol  < 0.01 % (0-0.07)   08/23/19  18:26    














Assessment and Plan


Assessment and plan: 


48-year-old lady with a history of depression, hypertension, diabetes was sent 

to the emergency room for evaluation for confusion, found to have a urinary 

tract infection.  She is on 1813 for suicide ideation,agree with plan as stated 

above

## 2019-08-25 RX ADMIN — LORAZEPAM PRN MG: 2 INJECTION INTRAMUSCULAR; INTRAVENOUS at 11:18

## 2019-08-25 RX ADMIN — ENOXAPARIN SODIUM SCH MG: 100 INJECTION SUBCUTANEOUS at 09:46

## 2019-08-25 RX ADMIN — FAMOTIDINE SCH MG: 10 INJECTION, SOLUTION INTRAVENOUS at 22:59

## 2019-08-25 RX ADMIN — LORAZEPAM PRN MG: 2 INJECTION INTRAMUSCULAR; INTRAVENOUS at 07:14

## 2019-08-25 RX ADMIN — CEFTRIAXONE SODIUM SCH MLS/HR: 1 INJECTION, POWDER, FOR SOLUTION INTRAMUSCULAR; INTRAVENOUS at 09:45

## 2019-08-25 RX ADMIN — SODIUM CHLORIDE SCH MLS/HR: 0.9 INJECTION, SOLUTION INTRAVENOUS at 09:46

## 2019-08-25 RX ADMIN — SODIUM CHLORIDE SCH MLS/HR: 0.9 INJECTION, SOLUTION INTRAVENOUS at 09:47

## 2019-08-25 RX ADMIN — LORAZEPAM PRN MG: 2 INJECTION INTRAMUSCULAR; INTRAVENOUS at 16:11

## 2019-08-25 RX ADMIN — NICOTINE SCH MG: 14 PATCH TRANSDERMAL at 09:46

## 2019-08-25 RX ADMIN — SODIUM CHLORIDE SCH MLS/HR: 0.9 INJECTION, SOLUTION INTRAVENOUS at 20:11

## 2019-08-25 RX ADMIN — AMLODIPINE BESYLATE SCH MG: 5 TABLET ORAL at 13:00

## 2019-08-25 RX ADMIN — LORAZEPAM PRN MG: 2 INJECTION INTRAMUSCULAR; INTRAVENOUS at 20:10

## 2019-08-25 RX ADMIN — SODIUM CHLORIDE SCH MLS/HR: 0.9 INJECTION, SOLUTION INTRAVENOUS at 00:38

## 2019-08-25 RX ADMIN — FAMOTIDINE SCH MG: 10 INJECTION, SOLUTION INTRAVENOUS at 09:46

## 2019-08-25 RX ADMIN — Medication SCH ML: at 22:59

## 2019-08-25 RX ADMIN — Medication SCH ML: at 09:48

## 2019-08-25 NOTE — PROGRESS NOTE
Assessment and Plan


Assessment and plan: 





Pt is a 48-year-old female with PMHx of HTN, Depression who was brought to the 

ER from a mental facility for c/o confusion. Patient was admitted at [facility] 

(psych facility) for depression and suicidal ideations, she was on 1013. Pt was 

sent to the [facility] ER for confusion and changed in mental status. In the ER, pt 

had a UA that was positive for UTI. Pt was A&O x 3, she was noted to have some 

confusion with the time of the day, 








Past History


Past Medical History: diabetes, hypertension, other (depression)





CTH neg


UA has 33 wbc, UDS neg, other labs wnl





SI


cont 1013, MH consult





UTI


cont abx, fup abx





Acute metabolic encephalopathy


likely due to UTI, improving





dvt ppx- lovenox





History


Interval history: 





per nursing she has been confused, attempting to leave the floor


no cp, no vomiting, no sob, no fever, no seizures





Hospitalist Physical





- Physical exam


Narrative exam: 





General.: Appears well, no distress, nontoxic


HEENT: Moist mucous membranes, extraocular muscles intact, no lymphadenopathy


Neck: supple


Cardiac: S1-S2 heard


Lungs: clear to auscultation bilaterally


Abdomen: soft , nontender,  nondistended,  bowel sounds positive


Extremities: no edema clubbing or cyanosis


Skin: no rash or lesions


Neurologic: no gross focal deficits, confused, oriented to person and year, not 

to place and not to month or day


Psych: calm, and cooperative 





- Constitutional


Vitals: 


                                        











Temp Pulse Resp BP Pulse Ox


 


 97.8 F   89   18   146/90   98 


 


 19 11:27  19 11:27  19 11:27  19 11:27  19 23:56











General appearance: Present: no acute distress





Results





- Labs


CBC & Chem 7: 


                                 19 06:37





                                 19 06:37


Labs: 


                             Laboratory Last Values











WBC  4.8 K/mm3 (4.5-11.0)   19  06:37    


 


RBC  3.50 M/mm3 (3.65-5.03)  L  19  06:37    


 


Hgb  11.5 gm/dl (10.1-14.3)   19  06:37    


 


Hct  34.4 % (30.3-42.9)   19  06:37    


 


MCV  98 fl (79-97)  H  19  06:37    


 


MCH  33 pg (28-32)  H  19  06:37    


 


MCHC  33 % (30-34)   19  06:37    


 


RDW  13.6 % (13.2-15.2)   19  06:37    


 


Plt Count  232 K/mm3 (140-440)   19  06:37    


 


Lymph % (Auto)  46.2 % (13.4-35.0)  H  19  18:26    


 


Mono % (Auto)  12.2 % (0.0-7.3)  H  19  18:26    


 


Eos % (Auto)  1.8 % (0.0-4.3)   19  18:26    


 


Baso % (Auto)  1.1 % (0.0-1.8)   19  18:    


 


Lymph #  3.0 K/mm3 (1.2-5.4)   19  18:    


 


Mono #  0.8 K/mm3 (0.0-0.8)   19  18:26    


 


Eos #  0.1 K/mm3 (0.0-0.4)   19  18:26    


 


Baso #  0.1 K/mm3 (0.0-0.1)   19  18:26    


 


Add Manual Diff  Complete   19  06:37    


 


Total Counted  100   19  06:37    


 


Seg Neutrophils %  Np   19  06:37    


 


Seg Neuts % (Manual)  42.0 % (40.0-70.0)   19  06:37    


 


  1.0 %  19  06:37    


 


  44.0 % (13.4-35.0)  H  19  06:37    


 


Reactive Lymphs % (Man)  0 %  19  06:37    


 


  13.0 % (0.0-7.3)  H  19  06:37    


 


  0 % (0.0-4.3)   19  06:37    


 


  0 % (0.0-1.8)   19  06:37    


 


  0 %  19  06:37    


 


  0 %  19  06:37    


 


  0 %  19  06:37    


 


  0 %  19  06:37    


 


Nucleated RBC %  Not Reportable   19  06:37    


 


Seg Neutrophils #  2.5 K/mm3 (1.8-7.7)   19  18:26    


 


Seg Neutrophils # Man  2.0 K/mm3 (1.8-7.7)   19  06:37    


 


Band Neutrophils #  0.0 K/mm3  19  06:37    


 


  2.1 K/mm3 (1.2-5.4)   19  06:37    


 


Abs React Lymphs (Man)  0.0 K/mm3  19  06:37    


 


  0.6 K/mm3 (0.0-0.8)   19  06:37    


 


  0.0 K/mm3 (0.0-0.4)   19  06:37    


 


  0.0 K/mm3 (0.0-0.1)   19  06:37    


 


  0.0 K/mm3  19  06:37    


 


  0.0 K/mm3  19  06:37    


 


  0.0 K/mm3  19  06:37    


 


Blast Cells #  0.0 K/mm3  19  06:37    


 


WBC Morphology  Not Reportable   19  06:37    


 


Hypersegmented Neuts  Not Reportable   19  06:37    


 


Hyposegmented Neuts  Not Reportable   19  06:37    


 


Hypogranular Neuts  Not Reportable   19  06:37    


 


  Not Reportable   19  06:37    


 


  Not Reportable   19  06:37    


 


  Not Reportable   19  06:37    


 


  Not Reportable   19  06:37    


 


  Not Reportable   19  06:37    


 


  Not Reportable   19  06:37    


 


  Not Reportable   19  06:37    


 


  Not Reportable   19  06:37    


 


Plt Clumps, EDTA  Not Reportable   19  06:37    


 


  Not Reportable   19  06:37    


 


  Not Reportable   19  06:37    


 


  Not Reportable   19  06:37    


 


Plt Morphology Comment  Not Reportable   19  06:37    


 


RBC Morphology  Not Reportable   19  06:37    


 


Dimorphic RBCs  Not Reportable   19  06:37    


 


  Not Reportable   19  06:37    


 


  Not Reportable   19  06:37    


 


  Not Reportable   19  06:37    


 


  1+   19  06:37    


 


  Not Reportable   19  06:37    


 


  Not Reportable   19  06:37    


 


  Not Reportable   19  06:37    


 


  Not Reportable   19  06:37    


 


  Not Reportable   19  06:37    


 


  Not Reportable   19  06:37    


 


  Not Reportable   19  06:37    


 


  Not Reportable   19  06:37    


 


  Not Reportable   19  06:37    


 


  Not Reportable   19  06:37    


 


  Not Reportable   19  06:37    


 


  Not Reportable   19  06:37    


 


  Not Reportable   19  06:37    


 


  Not Reportable   19  06:37    


 


  Not Reportable   19  06:37    


 


Acanthocytes (Spur)  Not Reportable   19  06:37    


 


Rouleaux  Not Reportable   19  06:37    


 


  Not Reportable   19  06:37    


 


  Not Reportable   19  06:37    


 


  Not Reportable   19  06:37    


 


  Not Reportable   19  06:37    


 


Hem Pathologist Commnt  No   19  06:37    


 


Sodium  141 mmol/L (137-145)   19  06:37    


 


Potassium  3.9 mmol/L (3.6-5.0)  D 19  06:37    


 


Chloride  107.0 mmol/L ()   19  06:37    


 


Carbon Dioxide  22 mmol/L (22-30)   19  06:37    


 


  16 mmol/L  19  06:37    


 


BUN  11 mg/dL (7-17)   19  06:37    


 


  0.5 mg/dL (0.7-1.2)  L  19  06:37    


 


Estimated GFR  > 60 ml/min  19  06:37    


 


  22 %  19  06:37    


 


Glucose  105 mg/dL ()  H  19  06:37    


 


POC Glucose  90  ()   19  07:35    


 


Calcium  9.0 mg/dL (8.4-10.2)   19  06:37    


 


  0.30 mg/dL (0.1-1.2)   19  18:26    


 


AST  20 units/L (5-40)   19  18:26    


 


ALT  15 units/L (7-56)   19  18:26    


 


  72 units/L ()   19  18:26    


 


  37.0 umol/L (25-60)   19  18:26    


 


  44 units/L ()   19  18:26    


 


  6.8 g/dL (6.3-8.2)   19  18:26    


 


  3.8 g/dL (3.9-5)  L  19  18:26    


 


  1.3 %  19  18:26    


 


  Yellow  (Yellow)   19  18:46    


 


  Slightly-cloudy  (Clear)   19  18:46    


 


  6.0  (5.0-7.0)   19  18:46    


 


Ur Specific Gravity  1.020  (1.003-1.030)   19  18:46    


 


  <15 mg/dl mg/dL (Negative)   19  18:46    


 


  Neg mg/dL (Negative)   19  18:46    


 


  Neg mg/dL (Negative)   19  18:46    


 


  Neg  (Negative)   19  18:46    


 


  Neg  (Negative)   19  18:46    


 


  Neg  (Negative)   19  18:46    


 


  < 2.0 mg/dL (<2.0)   19  18:46    


 


Ur Leukocyte Esterase  Lg  (Negative)   19  18:46    


 


  33.0 /HPF (0.0-6.0)  H  19  18:46    


 


  30.0 /HPF (0.0-6.0)   19  18:46    


 


U Epithel Cells (Auto)  12.0 /HPF (0-13.0)   19  18:46    


 


  1+ /HPF (Negative)   19  18:46    


 


  Few /HPF  19  18:46    


 


Salicylates  < 0.3 mg/dL (2.8-20.0)  L  19  18:26    


 


  Presumptive negative   19  18:46    


 


  Presumptive negative   19  18:46    


 


Acetaminophen  < 5.0 ug/mL (10.0-30.0)  L  19  18:26    


 


Ur Barbiturates Screen  Presumptive negative   19  18:46    


 


Ur Phencyclidine Scrn  Presumptive negative   19  18:46    


 


Ur Amphetamines Screen  Presumptive negative   19  18:46    


 


U Benzodiazepines Scrn  Presumptive negative   19  18:46    


 


  Presumptive negative   19  18:46    


 


U Marijuana (THC) Screen  Presumptive negative   19  18:46    


 


  Disclamer   19  18:46    


 


Plasma/Serum Alcohol  < 0.01 % (0-0.07)   19  18:26    














Active Medications





- Current Medications


Current Medications: 














Generic Name Dose Route Start Last Admin





  Trade Name Freq  PRN Reason Stop Dose Admin


 


Acetaminophen  650 mg  19 23:31 





  Tylenol  PO  





  Q4H PRN  





  Pain MILD(1-3)/Fever >100.5/HA  


 


Dextrose  50 ml  19 01:03 





  D50w (25gm) Syringe  IV  





  PRN PRN  





  Hypoglycemia  


 


Enoxaparin Sodium  40 mg  19 10:00  19 09:46





  Lovenox  SUB-Q   40 mg





  QDAY BENJAMIN   Administration


 


Famotidine  20 mg  19 23:45  19 09:46





  Pepcid  IV   20 mg





  BID BENJAMIN   Administration


 


Sodium Chloride  1,000 mls @ 100 mls/hr  19 23:45  19 09:47





  Nacl 0.9% 1000 Ml  IV   100 mls/hr





  AS DIRECT BENJAMIN   Administration


 


Ceftriaxone Sodium  1 gm in 50 mls @ 100 mls/hr  19 10:00  19 09:45





  Rocephin/Ns 1 Gm/50 Ml  IV   100 mls/hr





  Q24HR BENJAMIN   Administration





  Protocol  


 


Insulin Glargine  10 units  19 22:00  19 21:56





  Lantus  SUB-Q   Not Given





  QHS BENJAMIN  


 


Insulin Human Lispro  0 unit  19 07:30  19 08:04





  Humalog  SUB-Q   Not Given





  [facility]  





  Protocol  


 


Lorazepam  1 mg  19 15:58  19 11:18





  Ativan  IV   1 mg





  Q4H PRN   Administration





  Agitation  


 


Magnesium Hydroxide  30 ml  19 23:31 





  Milk Of Magnesia  PO  





  Q4H PRN  





  Constipation  


 


Nicotine  14 mg  19 16:00  19 09:46





  Habitrol  TD   14 mg





  QDAY BENJAMIN   Administration


 


Ondansetron HCl  4 mg  19 23:31 





  Zofran  IV  





  Q8H PRN  





  Nausea And Vomiting  


 


Sodium Chloride  10 ml  19 10:00  19 09:48





  Sodium Chloride Flush Syringe 10 Ml  IV   10 ml





  BID BENJAMIN   Administration


 


Sodium Chloride  10 ml  19 23:31  19 02:01





  Sodium Chloride Flush Syringe 10 Ml  IV   10 ml





  PRN PRN   Administration





  LINE FLUSH  














Nutrition/Malnutrition Assess





- Dietary Evaluation


Nutrition/Malnutrition Findings: 


                                        





Nutrition Notes                                            Start:  19 

14:49


Freq:                                                      Status: Active       




Protocol:                                                                       




 Document     19 14:49  SP  (Rec: 19 14:52  SP  SRW-

[id number])


 Nutrition Notes


     Need for Assessment generated from:         MD Order,Education


     Initial or Follow up                        Brief Note


     Current Diagnosis                           Diabetes,Hypertension


     Other Pertinent Diagnosis                   AMS, UTI


     Current Diet                                Cardiac


     Height                                      5 ft 5 in


     Weight                                      76.6 kg


     Ideal Body Weight (kg)                      56.81


     BMI                                         28.0


     Weight Status                               Overweight


     Subjective/Other Information                RD consulted for diet


                                                 education.  Pt from [facility] and


                                                 requires sitter at bedside.


                                                 No need for diet education at


                                                 this time (BG labs 107 and 105


                                                 and BP is WNL).


     Is patient on ventilator?                   No


     Is Patient Ambulatory and/or Out of Bed     Yes


     REE-(Mills-St. Jeor-ambulatory/OOB) [     1828.944


      [id number]]                               


     Calculation Used for Recommendations        Mills-St Jeor


     Additional Notes                            Pro needs 0.8-1g/k-77g/


                                                 day


                                                 Fluid needs 1ml/kcal


 Nutrition Intervention


     Follow-Up By:                               19


     Additional Comments                         F/U: intakes, wt

## 2019-08-26 RX ADMIN — Medication SCH ML: at 23:01

## 2019-08-26 RX ADMIN — FAMOTIDINE SCH MG: 10 INJECTION, SOLUTION INTRAVENOUS at 09:59

## 2019-08-26 RX ADMIN — Medication SCH ML: at 09:59

## 2019-08-26 RX ADMIN — HYDRALAZINE HYDROCHLORIDE PRN MG: 20 INJECTION INTRAMUSCULAR; INTRAVENOUS at 13:08

## 2019-08-26 RX ADMIN — NICOTINE SCH MG: 14 PATCH TRANSDERMAL at 09:58

## 2019-08-26 RX ADMIN — LORAZEPAM PRN MG: 2 INJECTION INTRAMUSCULAR; INTRAVENOUS at 23:00

## 2019-08-26 RX ADMIN — FOLIC ACID SCH MG: 1 TABLET ORAL at 09:59

## 2019-08-26 RX ADMIN — ENOXAPARIN SODIUM SCH MG: 100 INJECTION SUBCUTANEOUS at 09:58

## 2019-08-26 RX ADMIN — CEFTRIAXONE SODIUM SCH MLS/HR: 1 INJECTION, POWDER, FOR SOLUTION INTRAMUSCULAR; INTRAVENOUS at 09:58

## 2019-08-26 RX ADMIN — FAMOTIDINE SCH MG: 10 INJECTION, SOLUTION INTRAVENOUS at 22:57

## 2019-08-26 RX ADMIN — SODIUM CHLORIDE SCH MLS/HR: 0.9 INJECTION, SOLUTION INTRAVENOUS at 06:02

## 2019-08-26 RX ADMIN — SODIUM CHLORIDE SCH MLS/HR: 0.9 INJECTION, SOLUTION INTRAVENOUS at 16:37

## 2019-08-26 RX ADMIN — AMLODIPINE BESYLATE SCH MG: 5 TABLET ORAL at 10:04

## 2019-08-26 NOTE — CONSULTATION
History of Present Illness





- Reason for Consult


Consult date: 08/26/19


Reason for consult: Mental Health Evaluation


Requesting physician: JIMMY DAVIS





- Chief Complaint


Chief complaint: 


"I don't know how I got here"





- History of Present Psychiatric Illness


48-year-old AA female who presented to the ER for confusion. She was a patient 

at [facility] prior to her ER visit. Psychiatry was consulted to see the patient 

for behavioral disturbances. Also the patient is on a 1013.  Today the patient 

was calm, but somewhat confused during the assessment. She could not state where

she reside nor what hospital she was in prior to coming to the [facility]. She did 

state that she's from [place], [place] and have family in [place], [place]. She was asked

about her mental health, she stated, "I used to drink a lot."  Overall, the 

patient is a poor historian at this time. 








Medications and Allergies


                                    Allergies











Allergy/AdvReac Type Severity Reaction Status Date / Time


 


No Known Allergies Allergy   Unverified 08/23/19 18:30











                                Home Medications











 Medication  Instructions  Recorded  Confirmed  Last Taken  Type


 


Amlodipine Besylate [Norvasc] 5 mg PO DAILY 08/23/19 08/23/19 Unknown History


 


Folic Acid [Folvite] 1 tab PO DAILY 08/23/19 08/23/19 Unknown History











Active Meds: 


Active Medications





Acetaminophen (Tylenol)  650 mg PO Q4H PRN


   PRN Reason: Pain MILD(1-3)/Fever >100.5/HA


Amlodipine Besylate (Norvasc)  10 mg PO DAILY [facility]


Amlodipine Besylate (Norvasc)  5 mg PO ONCE ONE


   Stop: 08/26/19 14:01


   Last Admin: 08/26/19 13:07 Dose:  5 mg


   Documented by: 


Dextrose (D50w (25gm) Syringe)  50 ml IV PRN PRN


   PRN Reason: Hypoglycemia


Enoxaparin Sodium (Lovenox)  40 mg SUB-Q QDAY [facility]


   Last Admin: 08/26/19 09:58 Dose:  40 mg


   Documented by: 


Famotidine (Pepcid)  20 mg IV BID [facility]


   Last Admin: 08/26/19 09:59 Dose:  20 mg


   Documented by: 


Folic Acid (Folvite)  1 mg PO DAILY [facility]


   Last Admin: 08/26/19 09:59 Dose:  1 mg


   Documented by: 


Hydralazine HCl (Apresoline)  10 mg IV Q4HR PRN


   PRN Reason: BP >160/100


   Last Admin: 08/26/19 13:08 Dose:  10 mg


   Documented by: 


Sodium Chloride (Nacl 0.9% 1000 Ml)  1,000 mls @ 100 mls/hr IV AS DIRECT BENJAMIN


   Last Admin: 08/26/19 06:02 Dose:  100 mls/hr


   Documented by: 


Ceftriaxone Sodium (Rocephin/Ns 1 Gm/50 Ml)  1 gm in 50 mls @ 100 mls/hr IV 

Q24HR [facility]; Protocol


   Last Admin: 08/26/19 09:58 Dose:  100 mls/hr


   Documented by: 


Insulin Human Lispro (Humalog)  0 unit SUB-Q ACHS [facility]; Protocol


   Last Admin: 08/26/19 13:08 Dose:  Not Given


   Documented by: 


Lorazepam (Ativan)  1 mg IV Q4H PRN


   PRN Reason: Agitation


   Last Admin: 08/25/19 20:10 Dose:  1 mg


   Documented by: 


Magnesium Hydroxide (Milk Of Magnesia)  30 ml PO Q4H PRN


   PRN Reason: Constipation


Nicotine (Habitrol)  14 mg TD QDAY [facility]


   Last Admin: 08/26/19 09:58 Dose:  14 mg


   Documented by: 


Ondansetron HCl (Zofran)  4 mg IV Q8H PRN


   PRN Reason: Nausea And Vomiting


Sodium Chloride (Sodium Chloride Flush Syringe 10 Ml)  10 ml IV BID [facility]


   Last Admin: 08/26/19 09:59 Dose:  10 ml


   Documented by: 


Sodium Chloride (Sodium Chloride Flush Syringe 10 Ml)  10 ml IV PRN PRN


   PRN Reason: LINE FLUSH


   Last Admin: 08/24/19 02:01 Dose:  10 ml


   Documented by: 











Past psychiatric history





- Past Medical History


Past Medical History: No medical history, other


Past Surgical History: No surgical history, Other





- past Psychiatric treatment and history


psychiatric treatment history: 


Possible hx of alcohol abuse. Unable to obtain a fam psy hx. 





- Social History


Social history: lives with family





Mental Status Exam





- Vital signs


                                Last Vital Signs











Temp  98.7 F   08/26/19 12:01


 


Pulse  85   08/26/19 12:01


 


Resp  20   08/26/19 12:01


 


BP  163/121   08/26/19 12:01


 


Pulse Ox  100   08/26/19 12:01














- Exam


Narrative exam: 


MSE:





 Appearance: calm, cooperative   


 Behavior: regular eye contact 


 Speech: regular rate and tone


 Mood: "okay"


 Affect: congruent to mood 


 Thought Process: somewhat circumstantial      


 Thought Content: denies SI/HI's and AVH's  


 Motor Activity: sitting up in bed  


 Cognition: A/O x2 with some confusion 


 Insight: variable 


 Judgment: fair 






















































































  

















  

























































































  



































  

















  





























  








  





























  

















  

















  








Results


Result Diagrams: 


                                 08/24/19 06:37





                                 08/24/19 06:37


                              Abnormal lab results











  08/25/19 08/25/19 Range/Units





  18:10 22:45 


 


POC Glucose  109 H  112 H  ()  








All other labs normal.








Assessment and Plan


Assessment and plan: 


Impression: Hx of possible Alcohol Abuse per the patient. Today the patient was 

calm, but somewhat confused during the assessment.  





Recommendation/Plan: Continue 1013 and gather collateral information. Will 

determine if psy medication is necessary after patient is reassess in 24 hours, 

the patient maybe less confused.    





Dispo: Once collateral information is gathered, proper dispo will be determined.





Will staff with Dr GASTON Scales.

## 2019-08-26 NOTE — PROGRESS NOTE
Assessment and Plan


Assessment and plan: 





Pt is a 48-year-old female with PMHx of HTN, Depression who was brought to the 

ER from a mental facility for c/o confusion. Patient was admitted at [facility] 

(psych facility) for depression and suicidal ideations, she was on 1013. Pt was 

sent to the [facility] ER for confusion and changed in mental status. In the ER, pt 

had a UA that was positive for UTI. Pt was A&O x 3, she was noted to have some 

confusion with the time of the day, 








Past History


Past Medical History: diabetes, hypertension, other (depression)





CTH neg


UA has 33 wbc, UDS neg, other labs wnl





SI


cont 1013, MH consult





UTI


cont abx, fup ucx





Acute metabolic encephalopathy


likely due to UTI, improving





dvt ppx- lovenox





History


Interval history: 





no agitation, less confused


no cp, no vomiting, no sob, no fever, no seizures





Hospitalist Physical





- Physical exam


Narrative exam: 





General.: Appears well, no distress, nontoxic


HEENT: Moist mucous membranes, extraocular muscles intact, no lymphadenopathy


Neck: supple


Cardiac: S1-S2 heard


Lungs: clear to auscultation bilaterally


Abdomen: soft , nontender,  nondistended,  bowel sounds positive


Extremities: no edema clubbing or cyanosis


Skin: no rash or lesions


Neurologic: no gross focal deficits, confused, oriented to person and year, not 

to place and not to month or day


Psych: calm, and cooperative 





- Constitutional


Vitals: 


                                        











Temp Pulse Resp BP Pulse Ox


 


 98.2 F   94 H  16   127/78   99 


 


 19 04:56  19 04:56  19 04:56  19 04:56  19 04:56











General appearance: Present: no acute distress





Results





- Labs


CBC & Chem 7: 


                                 19 06:37





                                 19 06:37


Labs: 


                             Laboratory Last Values











WBC  4.8 K/mm3 (4.5-11.0)   19  06:37    


 


RBC  3.50 M/mm3 (3.65-5.03)  L  19  06:37    


 


Hgb  11.5 gm/dl (10.1-14.3)   19  06:37    


 


Hct  34.4 % (30.3-42.9)   19  06:37    


 


MCV  98 fl (79-97)  H  19  06:37    


 


MCH  33 pg (28-32)  H  19  06:37    


 


MCHC  33 % (30-34)   19  06:37    


 


RDW  13.6 % (13.2-15.2)   19  06:37    


 


Plt Count  232 K/mm3 (140-440)   19  06:37    


 


Lymph % (Auto)  46.2 % (13.4-35.0)  H  19  18:26    


 


Mono % (Auto)  12.2 % (0.0-7.3)  H  19  18:26    


 


Eos % (Auto)  1.8 % (0.0-4.3)   19  18:26    


 


Baso % (Auto)  1.1 % (0.0-1.8)   19  18:26    


 


Lymph #  3.0 K/mm3 (1.2-5.4)   19  18:26    


 


Mono #  0.8 K/mm3 (0.0-0.8)   19  18:26    


 


Eos #  0.1 K/mm3 (0.0-0.4)   19  18:26    


 


Baso #  0.1 K/mm3 (0.0-0.1)   19  18:26    


 


Add Manual Diff  Complete   19  06:37    


 


Total Counted  100   19  06:37    


 


Seg Neutrophils %  Np   19  06:37    


 


Seg Neuts % (Manual)  42.0 % (40.0-70.0)   19  06:37    


 


  1.0 %  19  06:37    


 


  44.0 % (13.4-35.0)  H  19  06:37    


 


Reactive Lymphs % (Man)  0 %  19  06:37    


 


  13.0 % (0.0-7.3)  H  19  06:37    


 


  0 % (0.0-4.3)   19  06:37    


 


  0 % (0.0-1.8)   19  06:37    


 


  0 %  19  06:37    


 


  0 %  19  06:37    


 


  0 %  19  06:37    


 


  0 %  19  06:37    


 


Nucleated RBC %  Not Reportable   19  06:37    


 


Seg Neutrophils #  2.5 K/mm3 (1.8-7.7)   19  18:26    


 


Seg Neutrophils # Man  2.0 K/mm3 (1.8-7.7)   19  06:37    


 


Band Neutrophils #  0.0 K/mm3  19  06:37    


 


  2.1 K/mm3 (1.2-5.4)   19  06:37    


 


Abs React Lymphs (Man)  0.0 K/mm3  19  06:37    


 


  0.6 K/mm3 (0.0-0.8)   19  06:37    


 


  0.0 K/mm3 (0.0-0.4)   19  06:37    


 


  0.0 K/mm3 (0.0-0.1)   19  06:37    


 


  0.0 K/mm3  19  06:37    


 


  0.0 K/mm3  19  06:37    


 


  0.0 K/mm3  19  06:37    


 


Blast Cells #  0.0 K/mm3  19  06:37    


 


WBC Morphology  Not Reportable   19  06:37    


 


Hypersegmented Neuts  Not Reportable   19  06:37    


 


Hyposegmented Neuts  Not Reportable   19  06:37    


 


Hypogranular Neuts  Not Reportable   19  06:37    


 


  Not Reportable   19  06:37    


 


  Not Reportable   19  06:37    


 


  Not Reportable   19  06:37    


 


  Not Reportable   19  06:37    


 


  Not Reportable   19  06:37    


 


  Not Reportable   19  06:37    


 


  Not Reportable   19  06:37    


 


  Not Reportable   19  06:37    


 


Plt Clumps, EDTA  Not Reportable   19  06:37    


 


  Not Reportable   19  06:37    


 


  Not Reportable   19  06:37    


 


  Not Reportable   19  06:37    


 


Plt Morphology Comment  Not Reportable   19  06:37    


 


RBC Morphology  Not Reportable   19  06:37    


 


Dimorphic RBCs  Not Reportable   19  06:37    


 


  Not Reportable   19  06:37    


 


  Not Reportable   19  06:37    


 


  Not Reportable   19  06:37    


 


  1+   19  06:37    


 


  Not Reportable   19  06:37    


 


  Not Reportable   19  06:37    


 


  Not Reportable   19  06:37    


 


  Not Reportable   19  06:37    


 


  Not Reportable   19  06:37    


 


  Not Reportable   19  06:37    


 


  Not Reportable   19  06:37    


 


  Not Reportable   19  06:37    


 


  Not Reportable   19  06:37    


 


  Not Reportable   19  06:37    


 


  Not Reportable   19  06:37    


 


  Not Reportable   19  06:37    


 


  Not Reportable   19  06:37    


 


  Not Reportable   19  06:37    


 


  Not Reportable   19  06:37    


 


Acanthocytes (Spur)  Not Reportable   19  06:37    


 


Rouleaux  Not Reportable   19  06:37    


 


  Not Reportable   19  06:37    


 


  Not Reportable   19  06:37    


 


  Not Reportable   19  06:37    


 


  Not Reportable   19  06:37    


 


Hem Pathologist Commnt  No   19  06:37    


 


Sodium  141 mmol/L (137-145)   19  06:37    


 


Potassium  3.9 mmol/L (3.6-5.0)  D 19  06:37    


 


Chloride  107.0 mmol/L ()   19  06:37    


 


Carbon Dioxide  22 mmol/L (22-30)   19  06:37    


 


  16 mmol/L  19  06:37    


 


BUN  11 mg/dL (7-17)   19  06:37    


 


  0.5 mg/dL (0.7-1.2)  L  19  06:37    


 


Estimated GFR  > 60 ml/min  19  06:37    


 


  22 %  19  06:37    


 


Glucose  105 mg/dL ()  H  19  06:37    


 


POC Glucose  112  ()  H  19  22:45    


 


Calcium  9.0 mg/dL (8.4-10.2)   19  06:37    


 


  0.30 mg/dL (0.1-1.2)   19  18:26    


 


AST  20 units/L (5-40)   19  18:26    


 


ALT  15 units/L (7-56)   19  18:26    


 


  72 units/L ()   19  18:26    


 


  37.0 umol/L (25-60)   19  18:26    


 


  44 units/L ()   19  18:26    


 


  6.8 g/dL (6.3-8.2)   19  18:26    


 


  3.8 g/dL (3.9-5)  L  19  18:26    


 


  1.3 %  19  18:26    


 


  Yellow  (Yellow)   19  18:46    


 


  Slightly-cloudy  (Clear)   19  18:46    


 


  6.0  (5.0-7.0)   19  18:46    


 


Ur Specific Gravity  1.020  (1.003-1.030)   19  18:46    


 


  <15 mg/dl mg/dL (Negative)   19  18:46    


 


  Neg mg/dL (Negative)   19  18:46    


 


  Neg mg/dL (Negative)   19  18:46    


 


  Neg  (Negative)   19  18:46    


 


  Neg  (Negative)   19  18:46    


 


  Neg  (Negative)   19  18:46    


 


  < 2.0 mg/dL (<2.0)   19  18:46    


 


Ur Leukocyte Esterase  Lg  (Negative)   19  18:46    


 


  33.0 /HPF (0.0-6.0)  H  19  18:46    


 


  30.0 /HPF (0.0-6.0)   19  18:46    


 


U Epithel Cells (Auto)  12.0 /HPF (0-13.0)   19  18:46    


 


  1+ /HPF (Negative)   19  18:46    


 


  Few /HPF  19  18:46    


 


Salicylates  < 0.3 mg/dL (2.8-20.0)  L  19  18:26    


 


  Presumptive negative   19  18:46    


 


  Presumptive negative   19  18:46    


 


Acetaminophen  < 5.0 ug/mL (10.0-30.0)  L  19  18:26    


 


Ur Barbiturates Screen  Presumptive negative   19  18:46    


 


Ur Phencyclidine Scrn  Presumptive negative   19  18:46    


 


Ur Amphetamines Screen  Presumptive negative   19  18:46    


 


U Benzodiazepines Scrn  Presumptive negative   19  18:46    


 


  Presumptive negative   19  18:46    


 


U Marijuana (THC) Screen  Presumptive negative   19  18:46    


 


  Disclamer   19  18:46    


 


Plasma/Serum Alcohol  < 0.01 % (0-0.07)   19  18:26    














Active Medications





- Current Medications


Current Medications: 














Generic Name Dose Route Start Last Admin





  Trade Name Freq  PRN Reason Stop Dose Admin


 


Acetaminophen  650 mg  19 23:31 





  Tylenol  PO  





  Q4H PRN  





  Pain MILD(1-3)/Fever >100.5/HA  


 


Amlodipine Besylate  5 mg  19 12:00  19 13:00





  Norvasc  PO   5 mg





  DAILY BENJAMIN   Administration


 


Dextrose  50 ml  19 01:03 





  D50w (25gm) Syringe  IV  





  PRN PRN  





  Hypoglycemia  


 


Enoxaparin Sodium  40 mg  19 10:00  19 09:46





  Lovenox  SUB-Q   40 mg





  QDAY BENJAMIN   Administration


 


Famotidine  20 mg  19 23:45  19 22:59





  Pepcid  IV   20 mg





  BID BENJAMIN   Administration


 


Folic Acid  1 mg  19 10:00 





  Folvite  PO  





  DAILY BENJAMIN  


 


Sodium Chloride  1,000 mls @ 100 mls/hr  19 23:45  19 06:02





  Nacl 0.9% 1000 Ml  IV   100 mls/hr





  AS DIRECT BENJAMIN   Administration


 


Ceftriaxone Sodium  1 gm in 50 mls @ 100 mls/hr  19 10:00  19 09:45





  Rocephin/Ns 1 Gm/50 Ml  IV   100 mls/hr





  Q24HR BENJAMIN   Administration





  Protocol  


 


Insulin Human Lispro  0 unit  19 07:30  19 22:59





  Humalog  SUB-Q   Not Given





  [facility]  





  Protocol  


 


Lorazepam  1 mg  19 15:58  19 20:10





  Ativan  IV   1 mg





  Q4H PRN   Administration





  Agitation  


 


Magnesium Hydroxide  30 ml  19 23:31 





  Milk Of Magnesia  PO  





  Q4H PRN  





  Constipation  


 


Nicotine  14 mg  19 16:00  19 09:46





  Habitrol  TD   14 mg





  QDAY BENJAMIN   Administration


 


Ondansetron HCl  4 mg  19 23:31 





  Zofran  IV  





  Q8H PRN  





  Nausea And Vomiting  


 


Sodium Chloride  10 ml  19 10:00  19 22:59





  Sodium Chloride Flush Syringe 10 Ml  IV   10 ml





  BID BENJAMIN   Administration


 


Sodium Chloride  10 ml  19 23:31  19 02:01





  Sodium Chloride Flush Syringe 10 Ml  IV   10 ml





  PRN PRN   Administration





  LINE FLUSH  














Nutrition/Malnutrition Assess





- Dietary Evaluation


Nutrition/Malnutrition Findings: 


                                        





Nutrition Notes                                            Start:  19 

14:49


Freq:                                                      Status: Active       




Protocol:                                                                       




 Document     19 14:49  SP  (Rec: 19 14:52  SP  SRW-

[id number])


 Nutrition Notes


     Need for Assessment generated from:         MD Order,Education


     Initial or Follow up                        Brief Note


     Current Diagnosis                           Diabetes,Hypertension


     Other Pertinent Diagnosis                   AMS, UTI


     Current Diet                                Cardiac


     Height                                      5 ft 5 in


     Weight                                      76.6 kg


     Ideal Body Weight (kg)                      56.81


     BMI                                         28.0


     Weight Status                               Overweight


     Subjective/Other Information                RD consulted for diet


                                                 education.  Pt from [facility] and


                                                 requires sitter at bedside.


                                                 No need for diet education at


                                                 this time (BG labs 107 and 105


                                                 and BP is WNL).


     Is patient on ventilator?                   No


     Is Patient Ambulatory and/or Out of Bed     Yes


     REE-([place] Jeor-ambulatory/OOB) [     1828.944


      [id number]]                               


     Calculation Used for Recommendations        [place] Jeor


     Additional Notes                            Pro needs 0.8-1g/k-77g/


                                                 day


                                                 Fluid needs 1ml/kcal


 Nutrition Intervention


     Follow-Up By:                               19


     Additional Comments                         F/U: intakes, wt

## 2019-08-27 RX ADMIN — FAMOTIDINE SCH MG: 10 INJECTION, SOLUTION INTRAVENOUS at 21:23

## 2019-08-27 RX ADMIN — CEFTRIAXONE SODIUM SCH MLS/HR: 1 INJECTION, POWDER, FOR SOLUTION INTRAMUSCULAR; INTRAVENOUS at 11:34

## 2019-08-27 RX ADMIN — Medication SCH ML: at 11:36

## 2019-08-27 RX ADMIN — FAMOTIDINE SCH MG: 10 INJECTION, SOLUTION INTRAVENOUS at 11:34

## 2019-08-27 RX ADMIN — Medication SCH ML: at 21:23

## 2019-08-27 RX ADMIN — NICOTINE SCH MG: 14 PATCH TRANSDERMAL at 11:33

## 2019-08-27 RX ADMIN — SODIUM CHLORIDE SCH MLS/HR: 0.9 INJECTION, SOLUTION INTRAVENOUS at 18:20

## 2019-08-27 RX ADMIN — AMLODIPINE BESYLATE SCH MG: 10 TABLET ORAL at 11:34

## 2019-08-27 RX ADMIN — SODIUM CHLORIDE SCH MLS/HR: 0.9 INJECTION, SOLUTION INTRAVENOUS at 00:38

## 2019-08-27 RX ADMIN — ENOXAPARIN SODIUM SCH MG: 100 INJECTION SUBCUTANEOUS at 11:34

## 2019-08-27 RX ADMIN — FOLIC ACID SCH MG: 1 TABLET ORAL at 11:35

## 2019-08-27 NOTE — PROGRESS NOTE
Assessment and Plan








- Acute metabolic encephalopathy


likely due to UTI, improving


CT of the head was negative





- SI


cont 1013, MH consult





- UTI


cont abx, f/u ucx








- dvt ppx- lovenox








Subjective


Date of service: 08/27/19


Principal diagnosis: metabolic encephalopathy, UTI, suicidal ideation.


Interval history: 





Patient lying quietly in bed.  Less agitated.


Denies any chest pain,  shortness of breath or fever.  No seizures.





Objective





- Exam


Narrative Exam: 





Constitutional: Well-nourished well-developed.  In no distress


Head: Normocephalic atraumatic


Eyes: Pupils are equal round and reactive to light


Nose: No enlarged turbinates, no septal deviation.


Mouth: Moist mucous membranes.


Neck: Supple no thyromegaly.  No bruit.  No JVD


Heart: Regular rate and rhythm, S1-S2 normal.  No rubs murmurs or gallop


Lungs: Clear to auscultation bilaterally. no rales or rhonchi


Abdomen: Soft, nontender. Bowel sound are present. 


Extremities: No edema, no cyanosis, no clubbing.


Neuro: Alert oriented Oriented x3. No focal sensory or motor deficit.


Skin: No rashes or hyperpigmented spots


Musculoskeletal system: No joint pain or swelling


Hematological: No petechia or subcutanous hemorrhages.


Immunological: No multiple septic spots on the skin


Lymphatic: No generalized lymphadenopathy


Psychiatry: Euthymic. Calm.














- Constitutional


Vitals: 


                               Vital Signs - 12hr











  08/27/19 08/27/19





  11:32 11:34


 


Temperature 98.1 F 


 


Pulse Rate 90 91 H


 


Respiratory 16 





Rate  


 


Blood Pressure 140/99 140/99


 


O2 Sat by Pulse 99 





Oximetry  














- Labs


CBC & Chem 7: 


                                 08/24/19 06:37





                                 08/24/19 06:37


Labs: 


                              Abnormal lab results











  08/26/19 08/27/19 Range/Units





  20:20 11:43 


 


POC Glucose  108 H  123 H  ()

## 2019-08-27 NOTE — PROGRESS NOTE
Subjective





- Reason for Consult


Consult date: 08/27/19


Reason for consult: Psychiatry Follow-up





- Chief Complaint


Chief complaint: 


"I want ti get my life together"





48-year-old AA female who presented to the ER for confusion. She was a patient 

at [facility] prior to her ER visit. Today the patient was calm and cooperative 

during the assessment. She stated that she want to move back to [place], [place] and be

with family once discharged. She stated that she want to get her life on track. 

She stated that she has a set of twins and a younger daughter. She spoke about 

her mother and sister who reside in [place], [place] as well.  She stated that she plan

not to relapse on alcohol (etoh) and recreational drugs. She denies SI/HI's and 

AVH's. Per notes, the patient have been pleasant throughout her hospital stay. 





Mental Status Exam





- Vital signs


                                Last Vital Signs











Temp  98.1 F   08/27/19 11:32


 


Pulse  91 H  08/27/19 11:34


 


Resp  16   08/27/19 11:32


 


BP  140/99   08/27/19 11:34


 


Pulse Ox  99   08/27/19 11:32














- Exam


Narrative exam: 


MSE:





 Appearance: calm, cooperative   


 Behavior: regular eye contact 


 Speech: regular rate and tone


 Mood: "okay"


 Affect: congruent to mood 


 Thought Process: more organized      


 Thought Content: denies SI/HI's and AVH's  


 Motor Activity: sitting up in bed  


 Cognition: A/O x3  


 Insight: fair 


 Judgment: fair 






















































































  

















  

























































































  



































  

















  





























  








  





























  

















  

















  


























Assessment and Plan


Impression: Hx of Alcohol Abuse/Substance Abuse per the patient. Today the 

patient was calm and cooperative during the assessment. The patient's mental 

status has improved. 





Recommendation/Plan: Rescind 1013. The patient do not meet 1013 criteria. 

Discussed the importance to abstain from recreation drugs use and alcohol 

consumption (etoh), she verbalized understanding. 





Dispo: The patient can follow up with [facility] for outpatient rehab 

services. 





Will staff with Dr GASTON Scales.

## 2019-08-28 RX ADMIN — CEFTRIAXONE SODIUM SCH MLS/HR: 1 INJECTION, POWDER, FOR SOLUTION INTRAMUSCULAR; INTRAVENOUS at 10:08

## 2019-08-28 RX ADMIN — SODIUM CHLORIDE SCH MLS/HR: 0.9 INJECTION, SOLUTION INTRAVENOUS at 10:11

## 2019-08-28 RX ADMIN — HYDRALAZINE HYDROCHLORIDE PRN MG: 20 INJECTION INTRAMUSCULAR; INTRAVENOUS at 18:49

## 2019-08-28 RX ADMIN — FAMOTIDINE SCH MG: 10 INJECTION, SOLUTION INTRAVENOUS at 10:09

## 2019-08-28 RX ADMIN — Medication SCH ML: at 22:00

## 2019-08-28 RX ADMIN — NICOTINE SCH MG: 14 PATCH TRANSDERMAL at 10:11

## 2019-08-28 RX ADMIN — LORAZEPAM PRN MG: 2 INJECTION INTRAMUSCULAR; INTRAVENOUS at 20:17

## 2019-08-28 RX ADMIN — Medication SCH ML: at 10:12

## 2019-08-28 RX ADMIN — FAMOTIDINE SCH MG: 10 INJECTION, SOLUTION INTRAVENOUS at 22:07

## 2019-08-28 RX ADMIN — AMLODIPINE BESYLATE SCH MG: 10 TABLET ORAL at 10:09

## 2019-08-28 RX ADMIN — ENOXAPARIN SODIUM SCH MG: 100 INJECTION SUBCUTANEOUS at 10:10

## 2019-08-28 RX ADMIN — FOLIC ACID SCH MG: 1 TABLET ORAL at 10:10

## 2019-08-28 NOTE — PROGRESS NOTE
Assessment and Plan








- Acute metabolic encephalopathy


likely due to UTI, improving


CT of the head was negative


Slightly disoriented. 





- SI


cont 1013, MH consult





- UTI


cont abx, f/u ucx








- dvt ppx- lovenox





-Disposition: D/C home in am








Subjective


Date of service: 08/28/19


Principal diagnosis: metabolic encephalopathy, UTI, suicidal ideation.


Interval history: 





Patient lying quietly in bed.  Less agitated.


Denies any chest pain, shortness of breath or fever. No seizures.





Objective





- Exam


Narrative Exam: 





Constitutional: Well-nourished well-developed. In no distress


Head: Normocephalic atraumatic


Eyes: Pupils are equal round and reactive to light


Nose: No enlarged turbinates, no septal deviation.


Mouth: Moist mucous membranes.


Neck: Supple no thyromegaly.  No bruit.  No JVD


Heart: Regular rate and rhythm, S1-S2 normal.  No rubs murmurs or gallop


Lungs: Clear to auscultation bilaterally. no rales or rhonchi


Abdomen: Soft, nontender. Bowel sound are present. 


Extremities: No edema, no cyanosis, no clubbing.


Neuro: Alert oriented Oriented x3. No focal sensory or motor deficit.


Skin: No rashes or hyperpigmented spots


Musculoskeletal system: No joint pain or swelling


Hematological: No petechia or subcutanous hemorrhages.


Immunological: No multiple septic spots on the skin


Lymphatic: No generalized lymphadenopathy


Psychiatry: Euthymic. Calm.














- Constitutional


Vitals: 


                               Vital Signs - 12hr











  08/28/19 08/28/19 08/28/19





  10:00 10:09 13:04


 


Temperature   97.3 F L


 


Pulse Rate   89


 


Pulse Rate [ 81  





Right Radial]   


 


Respiratory 20  18





Rate   


 


Blood Pressure  134/97 161/105


 


O2 Sat by Pulse 97  98





Oximetry   














  08/28/19 08/28/19





  17:36 18:49


 


Temperature 98.7 F 


 


Pulse Rate 95 H 93 H


 


Pulse Rate [  





Right Radial]  


 


Respiratory 93 H 





Rate  


 


Blood Pressure 161/107 161/107


 


O2 Sat by Pulse 100 





Oximetry  














- Labs


CBC & Chem 7: 


                                 08/24/19 06:37





                                 08/24/19 06:37


Labs: 


                              Abnormal lab results











  08/27/19 Range/Units





  22:01 


 


POC Glucose  109 H  ()

## 2019-08-29 VITALS — SYSTOLIC BLOOD PRESSURE: 127 MMHG | DIASTOLIC BLOOD PRESSURE: 79 MMHG

## 2019-08-29 RX ADMIN — FAMOTIDINE SCH MG: 10 INJECTION, SOLUTION INTRAVENOUS at 10:00

## 2019-08-29 RX ADMIN — SODIUM CHLORIDE SCH MLS/HR: 0.9 INJECTION, SOLUTION INTRAVENOUS at 06:07

## 2019-08-29 RX ADMIN — NICOTINE SCH MG: 14 PATCH TRANSDERMAL at 10:00

## 2019-08-29 RX ADMIN — Medication SCH ML: at 10:01

## 2019-08-29 RX ADMIN — AMLODIPINE BESYLATE SCH MG: 10 TABLET ORAL at 10:00

## 2019-08-29 RX ADMIN — ENOXAPARIN SODIUM SCH MG: 100 INJECTION SUBCUTANEOUS at 10:00

## 2019-08-29 RX ADMIN — LORAZEPAM PRN MG: 2 INJECTION INTRAMUSCULAR; INTRAVENOUS at 15:38

## 2019-08-29 RX ADMIN — FOLIC ACID SCH MG: 1 TABLET ORAL at 10:00

## 2019-08-29 RX ADMIN — CEFTRIAXONE SODIUM SCH MLS/HR: 1 INJECTION, POWDER, FOR SOLUTION INTRAMUSCULAR; INTRAVENOUS at 09:59

## 2019-08-30 ENCOUNTER — HOSPITAL ENCOUNTER (EMERGENCY)
Dept: HOSPITAL 5 - ED | Age: 49
LOS: 5 days | Discharge: HOME | End: 2019-09-04
Payer: SELF-PAY

## 2019-08-30 ENCOUNTER — HOSPITAL ENCOUNTER (EMERGENCY)
Dept: HOSPITAL 5 - ED | Age: 49
Discharge: HOME | End: 2019-08-30
Payer: SELF-PAY

## 2019-08-30 VITALS — DIASTOLIC BLOOD PRESSURE: 104 MMHG | SYSTOLIC BLOOD PRESSURE: 157 MMHG

## 2019-08-30 DIAGNOSIS — N39.0: ICD-10-CM

## 2019-08-30 DIAGNOSIS — F22: ICD-10-CM

## 2019-08-30 DIAGNOSIS — F17.200: ICD-10-CM

## 2019-08-30 DIAGNOSIS — E11.9: ICD-10-CM

## 2019-08-30 DIAGNOSIS — F41.9: Primary | ICD-10-CM

## 2019-08-30 DIAGNOSIS — F41.1: Primary | ICD-10-CM

## 2019-08-30 DIAGNOSIS — F15.10: ICD-10-CM

## 2019-08-30 LAB
BACTERIA #/AREA URNS HPF: (no result) /HPF
BASOPHILS # (AUTO): 0.1 K/MM3 (ref 0–0.1)
BASOPHILS NFR BLD AUTO: 0.8 % (ref 0–1.8)
BENZODIAZEPINES SCREEN,URINE: (no result)
BILIRUB UR QL STRIP: (no result)
BLOOD UR QL VISUAL: (no result)
BUN SERPL-MCNC: 18 MG/DL (ref 7–17)
BUN/CREAT SERPL: 18 %
CALCIUM SERPL-MCNC: 10.7 MG/DL (ref 8.4–10.2)
EOSINOPHIL # BLD AUTO: 0 K/MM3 (ref 0–0.4)
EOSINOPHIL NFR BLD AUTO: 0 % (ref 0–4.3)
HCT VFR BLD CALC: 36 % (ref 30.3–42.9)
HEMOLYSIS INDEX: 4
HGB BLD-MCNC: 12.4 GM/DL (ref 10.1–14.3)
INR PPP: 1.11 (ref 0.87–1.13)
LYMPHOCYTES # BLD AUTO: 1.6 K/MM3 (ref 1.2–5.4)
LYMPHOCYTES NFR BLD AUTO: 14.3 % (ref 13.4–35)
MCHC RBC AUTO-ENTMCNC: 35 % (ref 30–34)
MCV RBC AUTO: 95 FL (ref 79–97)
METHADONE SCREEN,URINE: (no result)
MONOCYTES # (AUTO): 1.3 K/MM3 (ref 0–0.8)
MONOCYTES % (AUTO): 11.6 % (ref 0–7.3)
MUCOUS THREADS #/AREA URNS HPF: (no result) /HPF
OPIATE SCREEN,URINE: (no result)
PH UR STRIP: 6 [PH] (ref 5–7)
PLATELET # BLD: 285 K/MM3 (ref 140–440)
PROT UR STRIP-MCNC: (no result) MG/DL
RBC # BLD AUTO: 3.8 M/MM3 (ref 3.65–5.03)
RBC #/AREA URNS HPF: 3 /HPF (ref 0–6)
T4 FREE SERPL-MCNC: 1.49 NG/DL (ref 0.76–1.46)
UROBILINOGEN UR-MCNC: < 2 MG/DL (ref ?–2)
WBC #/AREA URNS HPF: 11 /HPF (ref 0–6)

## 2019-08-30 PROCEDURE — 99285 EMERGENCY DEPT VISIT HI MDM: CPT

## 2019-08-30 PROCEDURE — 80048 BASIC METABOLIC PNL TOTAL CA: CPT

## 2019-08-30 PROCEDURE — 96375 TX/PRO/DX INJ NEW DRUG ADDON: CPT

## 2019-08-30 PROCEDURE — 71275 CT ANGIOGRAPHY CHEST: CPT

## 2019-08-30 PROCEDURE — 96365 THER/PROPH/DIAG IV INF INIT: CPT

## 2019-08-30 PROCEDURE — 96367 TX/PROPH/DG ADDL SEQ IV INF: CPT

## 2019-08-30 PROCEDURE — 82607 VITAMIN B-12: CPT

## 2019-08-30 PROCEDURE — 36415 COLL VENOUS BLD VENIPUNCTURE: CPT

## 2019-08-30 PROCEDURE — 84439 ASSAY OF FREE THYROXINE: CPT

## 2019-08-30 PROCEDURE — G0480 DRUG TEST DEF 1-7 CLASSES: HCPCS

## 2019-08-30 PROCEDURE — 93010 ELECTROCARDIOGRAM REPORT: CPT

## 2019-08-30 PROCEDURE — 71045 X-RAY EXAM CHEST 1 VIEW: CPT

## 2019-08-30 PROCEDURE — 83735 ASSAY OF MAGNESIUM: CPT

## 2019-08-30 PROCEDURE — 84443 ASSAY THYROID STIM HORMONE: CPT

## 2019-08-30 PROCEDURE — 87086 URINE CULTURE/COLONY COUNT: CPT

## 2019-08-30 PROCEDURE — 81001 URINALYSIS AUTO W/SCOPE: CPT

## 2019-08-30 PROCEDURE — 85025 COMPLETE CBC W/AUTO DIFF WBC: CPT

## 2019-08-30 PROCEDURE — 93005 ELECTROCARDIOGRAM TRACING: CPT

## 2019-08-30 PROCEDURE — 80320 DRUG SCREEN QUANTALCOHOLS: CPT

## 2019-08-30 PROCEDURE — 80307 DRUG TEST PRSMV CHEM ANLYZR: CPT

## 2019-08-30 PROCEDURE — 84703 CHORIONIC GONADOTROPIN ASSAY: CPT

## 2019-08-30 PROCEDURE — 85379 FIBRIN DEGRADATION QUANT: CPT

## 2019-08-30 PROCEDURE — 85610 PROTHROMBIN TIME: CPT

## 2019-08-30 PROCEDURE — 84484 ASSAY OF TROPONIN QUANT: CPT

## 2019-08-30 PROCEDURE — 83880 ASSAY OF NATRIURETIC PEPTIDE: CPT

## 2019-08-30 PROCEDURE — 96372 THER/PROPH/DIAG INJ SC/IM: CPT

## 2019-08-30 NOTE — CAT SCAN REPORT
CTA CHEST WITH IV CONTRAST



INDICATION:

tachycardia, elevated ddimer.



TECHNIQUE:

Axial CT images were obtained through the chest after injection of 100 cc Omnipaque 350 IV contrast. 
3 plane MIP reconstructions were produced. All CT scans at this location are performed using CT dose 
reduction for ALARA by means of automated exposure control. 



COMPARISON:

None available.



FINDINGS: Contrast opacification of pulmonary arteries is good. Exam is limited secondary to respirat
ory motion artifact

PULMONARY ARTERIES: No pulmonary emboli.

THORACIC AORTA: No acute abnormality.

HEART: Enlarged

CORONARY ARTERIES: No significant calcification.

PLEURA: No pleural effusion. No pneumothorax.

LYMPH NODES: No significant adenopathy.

LUNGS: Bilateral linear atelectasis and scattered subpleural groundglass parenchymal densities possib
ly representing mild edema or early inflammatory interstitial disease with active alveolitis



ADDITIONAL FINDINGS: None.



UPPER ABDOMEN: Calcified gallstone



SKELETAL STRUCTURES: No significant osseous abnormality.



IMPRESSION:

1. No CT evidence for pulmonary embolism. 

2. Possible bilateral subpleural active alveolitis from interstitial disease versus atypical pulmonar
y edema. Clinical correlation and follow-up HRCT is recommended in a few weeks.

3. Cholelithiasis

4. Cardiomegaly



Signer Name: Abraham Pollard MD 

Signed: 8/30/2019 6:40 PM

 Workstation Name: [id number]

## 2019-08-30 NOTE — EVENT NOTE
Date: 08/30/19


47 YO Female with Anxiety, DM, HTN being evaluated in ED for Psychosis. Pt seen 

and evaluated and found to have incidental finding of Alveolitis on CT of chest.

Pt has pulse oximetry of 98%-100% on room air. Pt resting comfortably in bed and

is without respiratory distress. Recommend steroid taper and outpatient F/U with

PCP. continue current care. Pt is medically optimized and cleared for discharge.







General: resting, cooperative.


Head: Atraumatic


Eyes: Normal appearance, Pupils equal and reactive to light, extraocular mov

ements intact


ENT: Normal oropharynx


Neck: Normal appearance, no posterior or midline tenderness, no meningismus


Chest: Clear to auscultation bilaterally, no wheezes, rales, or crackles


CV: Tachycardic regular rhythm


Abdomen: soft, normal bowel sounds, nontender, nondistended, no rebound or 

guarding


Back: Nontender


Extremity: Normal inspection, full range of motion, nontender


Neuro: Alert and oriented 2, speech clear, no gross motor or sensory deficit


Neck: Anxious, agitated


Skin: No rash, redness, warmth
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
## 2019-08-30 NOTE — XRAY REPORT
CHEST 1 VIEW 



INDICATION / CLINICAL INFORMATION:

Chest Pain.



COMPARISON: 

None available.



FINDINGS:



SUPPORT DEVICES: None.

HEART / MEDIASTINUM: No significant abnormality. 

LUNGS / PLEURA: No significant pulmonary or pleural abnormality..  No pneumothorax. 



ADDITIONAL FINDINGS: No significant additional findings.



IMPRESSION:

1. No acute findings.



Signer Name: Vinnie Jernigan MD 

Signed: 8/30/2019 10:56 AM

 Workstation Name: [id number]
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
## 2019-08-31 RX ADMIN — PREDNISONE SCH MG: 20 TABLET ORAL at 19:35

## 2019-08-31 RX ADMIN — AZITHROMYCIN SCH MG: 250 TABLET, FILM COATED ORAL at 19:35

## 2019-08-31 RX ADMIN — NITROFURANTOIN MONOHYDRATE/MACROCRYSTALS SCH MG: 75; 25 CAPSULE ORAL at 22:00

## 2019-08-31 NOTE — EMERGENCY DEPARTMENT REPORT
Blank Doc





- Documentation


Documentation: 





Patient was seen and evaluated by hospitalist yesterday for possible admission. 

Hospitalist did not feel like patient required admission and recommended 

steroids and antibiotics for a alveolitis since oxygen saturation was normal.  

ENT is also negative.  Patient is  medically cleared for inpatient psychiatric 

admission   And is currently on a 1013.   I wrote to continue Macrobid for UTI, 

azithromycin for alveolitis, and prednisone daily.  Mental health informed for 

evaluation in consult No

## 2019-09-01 RX ADMIN — NITROFURANTOIN MONOHYDRATE/MACROCRYSTALS SCH MG: 75; 25 CAPSULE ORAL at 22:06

## 2019-09-01 RX ADMIN — PREDNISONE SCH MG: 20 TABLET ORAL at 10:16

## 2019-09-01 RX ADMIN — AZITHROMYCIN SCH MG: 250 TABLET, FILM COATED ORAL at 10:16

## 2019-09-01 RX ADMIN — NITROFURANTOIN MONOHYDRATE/MACROCRYSTALS SCH MG: 75; 25 CAPSULE ORAL at 10:16

## 2019-09-01 NOTE — CONSULTATION
History of Present Illness





- Reason for Consult


Consult date: 09/01/19


Reason for consult: Mental Health Evaluation


Requesting physician: BOB RUTH





- Chief Complaint


Chief complaint: 


'What are you talking about"








- History of Present Psychiatric Illness


48 y.o. AA female who presented to the ER for bizarre behavior. Per the record, 

the patient was seen twice in 24 hours. This patient is known to me. Today the 

patient was confused during the assessment. She could not answer any questions 

asked of her. She could only name part of her address reference her residence. 

Per the record, the patient presented with psychotic symptoms on her second 

visit to the ER on 8/30/2019. Overall, the patient is not a good historian. No 

gestures of SI/HI's. 





Medications and Allergies


                                    Allergies











Allergy/AdvReac Type Severity Reaction Status Date / Time


 


No Known Allergies Allergy   Verified 08/30/19 08:44











                                Home Medications











 Medication  Instructions  Recorded  Confirmed  Last Taken  Type


 


Folic Acid [Folvite] 1 mg PO DAILY #30 tablet 08/29/19  Unknown Rx


 


amLODIPine [Norvasc] 10 mg PO DAILY #30 tablet 08/29/19  Unknown Rx


 


Prednisone [predniSONE 10 mg 10 mg PO .TAPER #1 tab.ds.pk 08/30/19  Unknown Rx





(6-Day Pack, 21 Tabs)]     











Active Meds: 


Active Medications





Azithromycin (Zithromax)  250 mg PO DAILY [facility]


   Stop: 09/03/19 10:01


   Last Admin: 09/01/19 10:16 Dose:  250 mg


   Documented by: 


Dextrose/Sodium Chloride (D5ns)  1,000 mls @ 250 mls/hr IV AS DIRECT BENJAMIN


Nitrofurantoin Macrocrystals (Macrobid)  100 mg PO BID B[facility]JAMIN


   Stop: 09/06/19 21:59


   Last Admin: 09/01/19 10:16 Dose:  100 mg


   Documented by: 


Prednisone (Deltasone)  40 mg PO DAILY B[facility]JAMIN


   Stop: 09/03/19 10:01


   Last Admin: 09/01/19 10:16 Dose:  40 mg


   Documented by: 











Past psychiatric history





- Past Medical History


Past Medical History: diabetes


Past Surgical History: Other





- past Psychiatric treatment and history


psychiatric treatment history: 


Hx of Substance and Alcohol abuse. Unable to obtain a fam psy hx.








- Social History


Social history: other (Homeless)





Mental Status Exam





- Vital signs


                                Last Vital Signs











Temp  98.3 F   09/01/19 08:00


 


Pulse  98 H  09/01/19 08:00


 


Resp  12   09/01/19 08:00


 


BP  140/98   09/01/19 08:00


 


Pulse Ox  98   09/01/19 08:00














- Exam


Narrative exam: 


MSE:





 Appearance: calm   


 Behavior: regular eye contact 


 Speech: regular rate and tone


 Mood: "okay"


 Affect: flat 


 Thought Process:confused      


 Thought Content: unable to assess   


 Motor Activity: sitting up in bed  


 Cognition: alert x 2


 Insight: unable to assess 


 Judgment: unable to assess






















































































  

















  

























































































  



































  

















  





























  








  





























  

















  

















  






































Results


Result Diagrams: 


                                 08/30/19 09:02





                                 08/30/19 09:02


All other labs normal.








Assessment and Plan


Assessment and plan: 


Impression: Hx of Alcohol/Substance Abuse. Today the patient was calm, but 

confused during the assessment.  





Recommendation/Plan: Continue 1013 and reassess the patient in 24 hours.  





Dispo: The patient was referred to inpatient psy services. 





Staffed with Dr GASTON Scales.

## 2019-09-02 LAB — T4 FREE SERPL-MCNC: 1.09 NG/DL (ref 0.76–1.46)

## 2019-09-02 RX ADMIN — NITROFURANTOIN MONOHYDRATE/MACROCRYSTALS SCH MG: 75; 25 CAPSULE ORAL at 22:02

## 2019-09-02 RX ADMIN — NITROFURANTOIN MONOHYDRATE/MACROCRYSTALS SCH MG: 75; 25 CAPSULE ORAL at 10:48

## 2019-09-02 RX ADMIN — PREDNISONE SCH MG: 20 TABLET ORAL at 10:48

## 2019-09-02 RX ADMIN — AZITHROMYCIN SCH MG: 250 TABLET, FILM COATED ORAL at 10:48

## 2019-09-02 NOTE — PROGRESS NOTE
Subjective





- Reason for Consult


Consult date: 09/02/19


Reason for consult: Psychiatry Follow-up





- Chief Complaint


Chief complaint: 


"Where am I"





48 y.o. AA female who presented to the ER for bizarre behavior. Per the record, 

the patient was seen twice in 24 hours. This patient is known to me. Today the 

patient was still somewhat confused during the assessment. Per collateral 

information from the patient's daughter William Flores at [phone], she 

stated that her mother has a long hx of alcohol abuse (20 plus years). She 

stated that her mother have been declining since MAy 2019. She stated that her 

mother have been missing for a month from [place], [place] after being discharged from 

a hospital in that area. She stated that she isn't aware how her mother got to 

[place], [place] prior to her arrival to [place] 8/21/2019. She stated that 

the patient's confusion have gotten worse. She denies that her mother had a hx 

of mental health other alcohol addiction. The patient denies SI/HI's. 





The patient's family is willing to pick the patient up if discharged from the 

ER. 





Mental Status Exam





- Vital signs


                                Last Vital Signs











Temp  98.5 F   09/02/19 07:00


 


Pulse  79   09/02/19 07:00


 


Resp  18   09/02/19 07:00


 


BP  108/76   09/02/19 07:00


 


Pulse Ox  100   09/02/19 07:00














- Exam


Narrative exam: 


MSE:





 Appearance: calm, cooperative   


 Behavior: regular eye contact 


 Speech: regular rate and tone


 Mood: "okay"


 Affect: flat 


 Thought Process: somewhat confused      


 Thought Content: denies Si/HI's and AVH's    


 Motor Activity: sitting up in bed  


 Cognition: alert x 2


 Insight: limited 


 Judgment: unable to assess






















































































  

















  

























































































  



































  

















  





























  








  





























  

















  

















  
































Assessment and Plan


Impression: Hx of Alcohol/ Substance Abuse. Today the patient was calm, but 

still somewhat confused during the assessment.  





DDx: R/O Wenicke Korsakoff Syndrome





Recommendation/Plan: Reevaluate the patient's 1013 in 24 hours. Discussed with 

ER Physician the collateral information that was obtained. The patient will be 

admin a banana bag (vitamins, folate acid, and thiamine). Discussed important 

factors if the patient return home with her daughter William Flores (keeping 

the house alcohol free and the ability by their family to take care her mother),

she verbalized understanding. 





Dispo: If the patient's 1013 is rescinded, the patient's family will be given 

literature about support groups in their local area. 





Staffed with Dr GASTON Scales.

## 2019-09-03 RX ADMIN — PREDNISONE SCH MG: 20 TABLET ORAL at 10:40

## 2019-09-03 RX ADMIN — AZITHROMYCIN SCH MG: 250 TABLET, FILM COATED ORAL at 10:40

## 2019-09-03 RX ADMIN — NITROFURANTOIN MONOHYDRATE/MACROCRYSTALS SCH MG: 75; 25 CAPSULE ORAL at 10:40

## 2019-09-03 RX ADMIN — NITROFURANTOIN MONOHYDRATE/MACROCRYSTALS SCH MG: 75; 25 CAPSULE ORAL at 22:25

## 2019-09-03 NOTE — PROGRESS NOTE
Subjective





- Reason for Consult


Consult date: 09/03/19


Reason for consult: Psychiatry Follow-up





- Chief Complaint


Chief complaint: 


"Hello"





48 y.o. AA female who presented to the ER for bizarre behavior. Per the record, 

the patient was seen twice in 24 hours. This patient is known to me. Today the 

patient was calm during the assessment. She was engaging and answered questions 

as best as she can reference her family. She was able to state her mother and 

kid's names when asked. I spoke with the patient's daughter William Flores at 

[phone], she stated that she will pick her mother up when discharged. The 

patient denies SI/HI's and AVH's. The patient have been pleasant since her 

arrival to the ER. 





Mental Status Exam





- Vital signs


                                Last Vital Signs











Temp  98.6 F   09/03/19 10:41


 


Pulse  89   09/03/19 10:41


 


Resp  16   09/03/19 10:41


 


BP  132/70   09/03/19 10:41


 


Pulse Ox  100   09/03/19 10:41














- Exam


Narrative exam: 


MSE:





 Appearance: calm, cooperative   


 Behavior: regular eye contact 


 Speech: regular rate and tone


 Mood: "okay"


 Affect: congruent to mood 


 Thought Process: somewhat confused      


 Thought Content: denies Si/HI's and AVH's    


 Motor Activity: sitting up in bed  


 Cognition: A/O x 2 with slight confusion


 Insight: variable 


 Judgment: variable to fair 






















































































  

















  

























































































  



































  

















  





























  








  





























  

















  

















  
































  





























Assessment and Plan


Impression: Hx of Alcohol/ Substance Abuse. Today the patient was calm, but 

still somewhat confused during the assessment.  





DDx: R/O Wenicke Korsakoff Syndrome





Recommendation/Plan: Rescind 1013. Discussed important factors if the patient 

return home with her daughter William Flores (keeping their home free of  

alcohol (etoh), she verbalized understanding. 





Dispo: The patient can follow up with her PCP or neurologist when discharged, 

her daughter verbalized understanding.  





Will staff with Dr GASTON Scales.

## 2019-09-04 VITALS — DIASTOLIC BLOOD PRESSURE: 67 MMHG | SYSTOLIC BLOOD PRESSURE: 117 MMHG

## 2019-09-04 RX ADMIN — NITROFURANTOIN MONOHYDRATE/MACROCRYSTALS SCH MG: 75; 25 CAPSULE ORAL at 21:47

## 2019-09-04 RX ADMIN — NITROFURANTOIN MONOHYDRATE/MACROCRYSTALS SCH MG: 75; 25 CAPSULE ORAL at 09:45

## 2019-09-04 NOTE — EMERGENCY DEPARTMENT REPORT
Blank Doc





- Documentation


Documentation: 


Patient is a 48-year-old female that originally presented to the emergency room 

for mental evaluation and psychiatric symptoms.  Patient was placed on a 1013.  

Patient has been medically cleared by the original ER doctor and hospitalist.  

Psychiatrically cleared by psychiatry and psych recommends rescinding the 1013. 

1013 has been rescinded.  Patient is stable for discharge.  Patient will be 

given antibiotics for UTI.  Patient will be discharged to the care of her family

and social workers are brought on board for discharge planning.

## 2024-11-10 NOTE — DISCHARGE SUMMARY
Discharge instructions reviewed with parents. Parent instructed to follow up with PCP and return with worsening symptoms. Parent verbalized an understanding, all questions/concerns addressed. VSS, Patient alert, breathing easily/non labored.   Providers





- Providers


Date of Admission: 


08/23/19 23:01





Date of discharge: 08/29/19


Attending physician: 


ANGELICA KLEIN





                                        





08/24/19 01:03


Consult to Dietitian/Nutrition [CONS] Routine 


   Physician Instructions: 


   Reason For Exam: 


   Reason for Consult: Diet education





08/25/19 12:25


Consult to Mental Health [CONS] Routine 


   Reason For Exam: behavioral disturbance


   Place consult to:: [facility]


   Notified:: 


   Phone number called:: [phone]


   Was contact made?: No


   Time called:: 13:09


   Comment:: no answer











Primary care physician: 


[facility], [place]








Hospitalization


Reason for admission: Acute metabolic encephaliopathy


Condition: Good


Pertinent studies: 





CT head


Procedures: 





CT head


Hospital course: 





Pt is a 48-year-old female with PMHx of HTN, Depression who was brought to the 

ER from a mental facility for c/o confusion. Patient was admitted at [facility] 

(psych facility) for depression and suicidal ideations, she was on 1013. Pt was 

sent to the [facility] ER for confusion and changed in mental status. In the ER, pt 

had a UA that was positive for UTI. Pt was A&O x 3, she was noted to have some 

confusion with the time of the day, she otherwise able to answer questions 

appropriately, she denies any back pain, denies dysuria, denies pain in 

micturation, denies fever, denies chills. A CT scan of the brain was negative 

for any acute lesions, she was started on antibiotic an admitted for further 

evaluation and treatment.  Pt commenced on iv hydation , iv antibiotic and  

antiphyschitic med. Mental status improved and pt is being discharged to follow 

up with PCP  and psych in 3-4 days





Disposition: DC-01 TO HOME OR SELFCARE


Time spent for discharge: 35 mins





- Discharge Diagnoses


(1) Confusion


Status: Acute   





(2) Encephalopathy acute


Status: Acute   





(3) UTI (urinary tract infection)


Status: Acute   


Qualifiers: 


   Urinary tract infection type: acute cystitis   Hematuria presence: with 

hematuria   Qualified Code(s): N30.01 - Acute cystitis with hematuria   





Core Measure Documentation





- Palliative Care


Palliative Care/ Comfort Measures: Not Applicable





- Core Measures


Any of the following diagnoses?: none





Exam





- Physical Exam


Narrative exam: 





Constitutional: Well-nourished well-developed.In no distress


Head: Normocephalic atraumatic


Eyes: Pupils are equal round and reactive to light


Nose: No enlarged turbinates, no septal deviation.


Mouth: Moist mucous membranes.


Neck: Supple no thyromegaly.  No bruit.  No JVD


Heart: Regular rate and rhythm, S1-S2 normal.  No rubs murmurs or gallop


Lungs: Clear to auscultation bilaterally. no rales or rhonchi


Abdomen: Soft, nontender. Bowel sound are present. 


Extremities: No edema, no cyanosis, no clubbing.


Neuro: Alert oriented Oriented x3. No focal sensory or motor deficit.


Skin: No rashes or hyperpigmented spots


Musculoskeletal system: No joint pain or swelling


Hematological: No petechia or subcutanous hemorrhages.


Immunological: No multiple septic spots on the skin


Lymphatic: No generalized lymphadenopathy


Psychiatry: Euthymic. Calm.














- Constitutional


Vitals: 


                                        











Temp Pulse Resp BP Pulse Ox


 


 98.0 F   79   18   127/79   98 


 


 08/29/19 11:29  08/29/19 11:29  08/29/19 11:29  08/29/19 11:29  08/29/19 11:29














Plan


Activity: fall precautions


Weight Bearing Status: Non-Weight Bearing


Follow up with: 


[facility], [place] [Primary Care Provider] - 3-5 Days


Prescriptions: 


Folic Acid [Folvite] 1 mg PO DAILY #30 tablet


amLODIPine [Norvasc] 10 mg PO DAILY #30 tablet